# Patient Record
Sex: FEMALE | Race: BLACK OR AFRICAN AMERICAN | Employment: FULL TIME
[De-identification: names, ages, dates, MRNs, and addresses within clinical notes are randomized per-mention and may not be internally consistent; named-entity substitution may affect disease eponyms.]

---

## 2022-07-09 PROBLEM — M15.0 PRIMARY OSTEOARTHRITIS INVOLVING MULTIPLE JOINTS: Status: ACTIVE | Noted: 2022-07-09

## 2024-10-07 ENCOUNTER — HOSPITAL ENCOUNTER (OUTPATIENT)
Facility: HOSPITAL | Age: 59
Discharge: HOME | End: 2024-10-10
Attending: INTERNAL MEDICINE
Payer: COMMERCIAL

## 2024-10-07 DIAGNOSIS — Z12.31 ENCOUNTER FOR SCREENING MAMMOGRAM FOR MALIGNANT NEOPLASM OF BREAST: ICD-10-CM

## 2024-10-07 PROCEDURE — 77063 BREAST TOMOSYNTHESIS BI: CPT

## 2024-10-19 PROBLEM — Z13.5 SCREENING FOR GLAUCOMA: Status: RESOLVED | Noted: 2024-09-19 | Resolved: 2024-10-19

## 2024-11-04 RX ORDER — CETIRIZINE HYDROCHLORIDE 10 MG/1
TABLET ORAL
Qty: 90 TABLET | Refills: 1 | Status: SHIPPED | OUTPATIENT
Start: 2024-11-04

## 2024-11-04 RX ORDER — AMLODIPINE BESYLATE 5 MG/1
5 TABLET ORAL DAILY
Qty: 90 TABLET | Refills: 2 | Status: SHIPPED | OUTPATIENT
Start: 2024-11-04

## 2024-11-04 RX ORDER — LOSARTAN POTASSIUM AND HYDROCHLOROTHIAZIDE 12.5; 5 MG/1; MG/1
1 TABLET ORAL EVERY MORNING
Qty: 90 TABLET | Refills: 2 | Status: SHIPPED | OUTPATIENT
Start: 2024-11-04

## 2024-11-04 RX ORDER — AMLODIPINE BESYLATE 5 MG/1
5 TABLET ORAL DAILY
Qty: 90 TABLET | Refills: 2 | OUTPATIENT
Start: 2024-11-04

## 2024-11-04 RX ORDER — LOSARTAN POTASSIUM AND HYDROCHLOROTHIAZIDE 12.5; 5 MG/1; MG/1
1 TABLET ORAL DAILY
Qty: 90 TABLET | Refills: 2 | OUTPATIENT
Start: 2024-11-04

## 2024-11-17 RX ORDER — DULOXETIN HYDROCHLORIDE 30 MG/1
30 CAPSULE, DELAYED RELEASE ORAL EVERY MORNING
Qty: 90 CAPSULE | Refills: 1 | Status: SHIPPED | OUTPATIENT
Start: 2024-11-17

## 2024-12-11 RX ORDER — DICLOFENAC SODIUM 75 MG/1
TABLET, DELAYED RELEASE ORAL
Qty: 30 TABLET | Refills: 2 | Status: SHIPPED | OUTPATIENT
Start: 2024-12-11

## 2024-12-19 RX ORDER — DICLOFENAC SODIUM 75 MG/1
TABLET, DELAYED RELEASE ORAL
Qty: 30 TABLET | Refills: 0 | Status: SHIPPED | OUTPATIENT
Start: 2024-12-19

## 2024-12-20 ENCOUNTER — HOSPITAL ENCOUNTER (OUTPATIENT)
Facility: HOSPITAL | Age: 59
Discharge: HOME | End: 2024-12-23
Attending: OBSTETRICS & GYNECOLOGY
Payer: COMMERCIAL

## 2024-12-20 DIAGNOSIS — C78.6 SECONDARY MALIGNANT NEOPLASM OF RETROPERITONEUM AND PERITONEUM (HCC): ICD-10-CM

## 2024-12-20 DIAGNOSIS — Z51.11 ENCOUNTER FOR ANTINEOPLASTIC CHEMOTHERAPY: ICD-10-CM

## 2024-12-20 DIAGNOSIS — C54.1 ENDOMETRIAL SARCOMA (HCC): ICD-10-CM

## 2024-12-20 DIAGNOSIS — Z79.899 NEED FOR PROPHYLACTIC CHEMOTHERAPY: ICD-10-CM

## 2024-12-20 PROCEDURE — 6360000004 HC RX CONTRAST MEDICATION: Performed by: OBSTETRICS & GYNECOLOGY

## 2024-12-20 PROCEDURE — 74177 CT ABD & PELVIS W/CONTRAST: CPT

## 2024-12-20 RX ORDER — IOPAMIDOL 755 MG/ML
100 INJECTION, SOLUTION INTRAVASCULAR
Status: COMPLETED | OUTPATIENT
Start: 2024-12-20 | End: 2024-12-20

## 2024-12-20 RX ADMIN — IOPAMIDOL 100 ML: 755 INJECTION, SOLUTION INTRAVENOUS at 11:31

## 2025-02-02 ENCOUNTER — HOSPITAL ENCOUNTER (EMERGENCY)
Facility: HOSPITAL | Age: 60
Discharge: HOME | End: 2025-02-02
Attending: EMERGENCY MEDICINE
Payer: COMMERCIAL

## 2025-02-02 VITALS — TEMPERATURE: 97.7 F | HEART RATE: 91 BPM | SYSTOLIC BLOOD PRESSURE: 129 MMHG | DIASTOLIC BLOOD PRESSURE: 64 MMHG

## 2025-02-02 DIAGNOSIS — J02.9 VIRAL PHARYNGITIS: Primary | ICD-10-CM

## 2025-02-02 LAB
APPEARANCE UR: CLEAR
BACTERIA URNS QL MICRO: NEGATIVE /HPF
BILIRUB UR QL: NEGATIVE
COLOR UR: NORMAL
EPITH CASTS URNS QL MICRO: NORMAL /LPF
FLUAV RNA SPEC QL NAA+PROBE: NOT DETECTED
FLUBV RNA SPEC QL NAA+PROBE: NOT DETECTED
GLUCOSE UR STRIP.AUTO-MCNC: NEGATIVE MG/DL
HGB UR QL STRIP: NEGATIVE
KETONES UR QL STRIP.AUTO: NEGATIVE MG/DL
LEUKOCYTE ESTERASE UR QL STRIP.AUTO: NEGATIVE
NITRITE UR QL STRIP.AUTO: NEGATIVE
PH UR STRIP: 5.5 (ref 5–8)
PROT UR STRIP-MCNC: NEGATIVE MG/DL
RBC #/AREA URNS HPF: NORMAL /HPF
SARS-COV-2 RNA RESP QL NAA+PROBE: NOT DETECTED
SOURCE: NORMAL
SP GR UR REFRACTOMETRY: 1.02 (ref 1–1.03)
SPECIMEN HOLD: NORMAL
UROBILINOGEN UR QL STRIP.AUTO: 0.2 EU/DL (ref 0.2–1)
WBC URNS QL MICRO: NORMAL /HPF (ref 0–4)

## 2025-02-02 PROCEDURE — 87636 SARSCOV2 & INF A&B AMP PRB: CPT

## 2025-02-02 PROCEDURE — 6360000002 HC RX W HCPCS: Performed by: EMERGENCY MEDICINE

## 2025-02-02 PROCEDURE — 99283 EMERGENCY DEPT VISIT LOW MDM: CPT

## 2025-02-02 PROCEDURE — 81001 URINALYSIS AUTO W/SCOPE: CPT

## 2025-02-02 RX ORDER — DEXAMETHASONE SODIUM PHOSPHATE 10 MG/ML
10 INJECTION, SOLUTION INTRAMUSCULAR; INTRAVENOUS ONCE
Status: COMPLETED | OUTPATIENT
Start: 2025-02-02 | End: 2025-02-02

## 2025-02-02 RX ADMIN — DEXAMETHASONE SODIUM PHOSPHATE 10 MG: 10 INJECTION, SOLUTION INTRAMUSCULAR; INTRAVENOUS at 14:42

## 2025-02-02 ASSESSMENT — PAIN - FUNCTIONAL ASSESSMENT: PAIN_FUNCTIONAL_ASSESSMENT: NONE - DENIES PAIN

## 2025-02-02 NOTE — ED TRIAGE NOTES
Pt arrived to ED via wheelchair c/o sore throat. Pt denies N/V/D, fevers or headaches. Pt endorses rash to bilateral arms and bilateral thighs. Pt endorses dysuria.

## 2025-02-02 NOTE — ED PROVIDER NOTES
[facility] DE[facility]  EMERGENCY DEPARTMENT ENCOUNTER      Patient Name: Jackie Yuan  MRN: [id number]  Birthdate 1965  Date of Evaluation: 2025  Physician: Jake Lee MD    CHIEF COMPLAINT       Chief Complaint   Patient presents with    Pharyngitis       HISTORY OF PRESENT ILLNESS   (Location/Symptom, Timing/Onset, Context/Setting, Quality, Duration, Modifying Factors, Severity)   Jackie Yuan, 59 y.o., female     59-year-old female with a history of hypertension, hypertriglyceridemia, asthma, chronic pain presents with a chief complaint of sore throat and rash.  Patient has had a sore throat for the last 5 days.  She has noticed a rash on her upper extremities and her neck around her thighs.  She endorses new Dove soap over the last month.  She also complains of dysuria          Nursing Notes were reviewed.    REVIEW OF SYSTEMS    (Not required)   Review of Systems    Except as noted above the remainder of the review of systems was reviewed and negative.     PAST MEDICAL HISTORY     Past Medical History:   Diagnosis Date    Arthritis     Asthma     Chronic pain     High triglycerides 2022    Hypertension     Ulcer of right lower extremity, limited to breakdown of skin (HCC) 3/24/2022       SURGICAL HISTORY       Past Surgical History:   Procedure Laterality Date     SECTION       SECTION  1989    COLONOSCOPY      DILATION AND CURETTAGE OF UTERUS N/A 2023    DILATATION AND CURETTAGE HYSTEROSCOPY, ENDOMETRIAL EVALUATION performed by Anne Felix MD at [facility] MAIN OR       CURRENT MEDICATIONS       Previous Medications    ACETAMINOPHEN (TYLENOL) 650 MG EXTENDED RELEASE TABLET    Take 1 tablet by mouth 2 times daily as needed    ALBUTEROL SULFATE HFA (PROVENTIL;VENTOLIN;PROAIR) 108 (90 BASE) MCG/ACT INHALER    Inhale 2 puffs into the lungs every 6 hours as needed for Wheezing or Shortness of Breath    AMLODIPINE (NORVASC) 5 MG TABLET

## 2025-02-05 ENCOUNTER — OFFICE VISIT (OUTPATIENT)
Facility: CLINIC | Age: 60
End: 2025-02-05
Payer: COMMERCIAL

## 2025-02-05 ENCOUNTER — HOSPITAL ENCOUNTER (OUTPATIENT)
Facility: HOSPITAL | Age: 60
Discharge: HOME | End: 2025-02-05
Attending: SURGERY
Payer: COMMERCIAL

## 2025-02-05 VITALS
SYSTOLIC BLOOD PRESSURE: 133 MMHG | RESPIRATION RATE: 18 BRPM | HEART RATE: 104 BPM | DIASTOLIC BLOOD PRESSURE: 45 MMHG | TEMPERATURE: 97.2 F

## 2025-02-05 VITALS
DIASTOLIC BLOOD PRESSURE: 62 MMHG | HEART RATE: 63 BPM | HEIGHT: 64 IN | OXYGEN SATURATION: 98 % | RESPIRATION RATE: 16 BRPM | SYSTOLIC BLOOD PRESSURE: 112 MMHG | TEMPERATURE: 97.8 F | WEIGHT: 293 LBS | BODY MASS INDEX: 50.02 KG/M2

## 2025-02-05 DIAGNOSIS — S91.301A OPEN WOUND OF RIGHT FOOT, INITIAL ENCOUNTER: ICD-10-CM

## 2025-02-05 DIAGNOSIS — R73.03 PREDIABETES: Primary | ICD-10-CM

## 2025-02-05 DIAGNOSIS — S96.902A OPEN WOUND OF LEFT FOOT WITH TENDON INVOLVEMENT, INITIAL ENCOUNTER: ICD-10-CM

## 2025-02-05 DIAGNOSIS — Z51.81 MEDICATION MONITORING ENCOUNTER: ICD-10-CM

## 2025-02-05 DIAGNOSIS — R79.89 ELEVATED SERUM CREATININE: ICD-10-CM

## 2025-02-05 DIAGNOSIS — D64.9 ANEMIA, UNSPECIFIED TYPE: ICD-10-CM

## 2025-02-05 DIAGNOSIS — Z85.42 HISTORY OF UTERINE CANCER: ICD-10-CM

## 2025-02-05 DIAGNOSIS — S91.302A OPEN WOUND OF LEFT FOOT WITH TENDON INVOLVEMENT, INITIAL ENCOUNTER: ICD-10-CM

## 2025-02-05 DIAGNOSIS — R73.03 PREDIABETES: ICD-10-CM

## 2025-02-05 DIAGNOSIS — I10 ESSENTIAL HYPERTENSION: Primary | ICD-10-CM

## 2025-02-05 DIAGNOSIS — M47.27 OSTEOARTHRITIS OF SPINE WITH RADICULOPATHY, LUMBOSACRAL REGION: ICD-10-CM

## 2025-02-05 DIAGNOSIS — R21 RASH AND NONSPECIFIC SKIN ERUPTION: ICD-10-CM

## 2025-02-05 DIAGNOSIS — M15.0 PRIMARY OSTEOARTHRITIS INVOLVING MULTIPLE JOINTS: ICD-10-CM

## 2025-02-05 DIAGNOSIS — Z86.19 HISTORY OF CLOSTRIDIUM DIFFICILE INFECTION: ICD-10-CM

## 2025-02-05 DIAGNOSIS — J45.20 MILD INTERMITTENT ASTHMA WITHOUT COMPLICATION: ICD-10-CM

## 2025-02-05 DIAGNOSIS — I10 ESSENTIAL HYPERTENSION: ICD-10-CM

## 2025-02-05 DIAGNOSIS — Z79.899 LONG TERM CURRENT USE OF IMMUNOSUPPRESSIVE DRUG: ICD-10-CM

## 2025-02-05 PROCEDURE — 11046 DBRDMT MUSC&/FSCA EA ADDL: CPT

## 2025-02-05 PROCEDURE — 11043 DBRDMT MUSC&/FSCA 1ST 20/<: CPT

## 2025-02-05 PROCEDURE — 3078F DIAST BP <80 MM HG: CPT | Performed by: INTERNAL MEDICINE

## 2025-02-05 PROCEDURE — 99213 OFFICE O/P EST LOW 20 MIN: CPT

## 2025-02-05 PROCEDURE — 3074F SYST BP LT 130 MM HG: CPT | Performed by: INTERNAL MEDICINE

## 2025-02-05 PROCEDURE — 99214 OFFICE O/P EST MOD 30 MIN: CPT | Performed by: INTERNAL MEDICINE

## 2025-02-05 RX ORDER — TRIAMCINOLONE ACETONIDE 1 MG/G
CREAM TOPICAL 2 TIMES DAILY
COMMUNITY
Start: 2025-01-24

## 2025-02-05 RX ORDER — BETAMETHASONE DIPROPIONATE 0.5 MG/G
CREAM TOPICAL
Qty: 45 G | Refills: 1 | Status: SHIPPED | OUTPATIENT
Start: 2025-02-05

## 2025-02-05 RX ORDER — AZITHROMYCIN 250 MG/1
TABLET, FILM COATED ORAL
Qty: 6 TABLET | Refills: 0 | Status: SHIPPED | OUTPATIENT
Start: 2025-02-05 | End: 2025-02-15

## 2025-02-05 SDOH — ECONOMIC STABILITY: FOOD INSECURITY: WITHIN THE PAST 12 MONTHS, YOU WORRIED THAT YOUR FOOD WOULD RUN OUT BEFORE YOU GOT MONEY TO BUY MORE.: NEVER TRUE

## 2025-02-05 SDOH — ECONOMIC STABILITY: FOOD INSECURITY: WITHIN THE PAST 12 MONTHS, THE FOOD YOU BOUGHT JUST DIDN'T LAST AND YOU DIDN'T HAVE MONEY TO GET MORE.: NEVER TRUE

## 2025-02-05 ASSESSMENT — PATIENT HEALTH QUESTIONNAIRE - PHQ9
SUM OF ALL RESPONSES TO PHQ9 QUESTIONS 1 & 2: 0
SUM OF ALL RESPONSES TO PHQ QUESTIONS 1-9: 0
1. LITTLE INTEREST OR PLEASURE IN DOING THINGS: NOT AT ALL
SUM OF ALL RESPONSES TO PHQ QUESTIONS 1-9: 0
2. FEELING DOWN, DEPRESSED OR HOPELESS: NOT AT ALL
SUM OF ALL RESPONSES TO PHQ QUESTIONS 1-9: 0
SUM OF ALL RESPONSES TO PHQ QUESTIONS 1-9: 0

## 2025-02-05 ASSESSMENT — ENCOUNTER SYMPTOMS
ALLERGIC/IMMUNOLOGIC NEGATIVE: 1
GASTROINTESTINAL NEGATIVE: 1
RESPIRATORY NEGATIVE: 1

## 2025-02-05 ASSESSMENT — PAIN DESCRIPTION - LOCATION: LOCATION: LEG

## 2025-02-05 ASSESSMENT — PAIN SCALES - GENERAL: PAINLEVEL_OUTOF10: 7

## 2025-02-05 NOTE — PROGRESS NOTES
assessment and treatment was: [] 15-20 min  [] 21-30 min  [] 31-44 min  [x] 45 min or more  This included time retrieving and reviewing records with patient and education provided to patient regarding disease process(es), offloading or pressure relief, nutrition needed for wound healing, smoking cessation when applicable, and infection risk.    This time also included physician non-face-to-face service time visit on the date of service such as  Preparing to see the patient (eg, review of tests)  Obtaining and/or reviewing separately obtained history  Performing a medically necessary appropriate examination and/or evaluation  Counseling and educating the patient/family/caregiver  Ordering medications, tests, or procedures  Referring and communicating with other health care professionals as needed  Documenting clinical information in the electronic or other health record  Independently interpreting results (not reported separately) and communicating results to the patient/family/caregiver  Care coordination (not reported separately)    Wound 02/05/25 Ankle Right #1 (Active)   Wound Image   02/05/25 0826   Wound Etiology Arterial 02/05/25 0912   Dressing Status Intact;Old drainage noted 02/05/25 0826   Wound Cleansed Cleansed with saline 02/05/25 0826   Wound Length (cm) 13.5 cm 02/05/25 0826   Wound Width (cm) 5 cm 02/05/25 0826   Wound Depth (cm) 0.1 cm 02/05/25 0826   Wound Surface Area (cm^2) 67.5 cm^2 02/05/25 0826   Wound Volume (cm^3) 6.75 cm^3 02/05/25 0826   Post-Procedure Length (cm) 13.6 cm 02/05/25 0912   Post-Procedure Width (cm) 5.1 cm 02/05/25 0912   Post-Procedure Depth (cm) 0.2 cm 02/05/25 0912   Post-Procedure Surface Area (cm^2) 69.36 cm^2 02/05/25 0912   Post-Procedure Volume (cm^3) 13.872 cm^3 02/05/25 0912   Wound Assessment Exposed structure muscle 02/05/25 0912   Drainage Amount Large (50-75% saturated) 02/05/25 0826   Drainage Description Serosanguinous;Yellow 02/05/25 0826   Odor Mild 02/05/25

## 2025-02-05 NOTE — DISCHARGE INSTRUCTIONS
bed         [] sitting  [] Total non-weight bearing  [] Right Leg  [] Left Leg          [] Assistive Device     [] Walker        [] Cane           [] Wheelchair  [] Crutches              [] Surgical shoe    [] Wedge Shoe  [] Foam Boot(s)  [] Knee Scooter              [] Cast Boot   [] CROW Boot     [] Diabetic Shoes    [] Offloading heel boot  [x] Other: SHOES THAT DON'T PUT PRESSURE ON WOUNDS     Contact Cast:  Apply:  [] Total Contact Cast Applied in Clinic to      []RightLeg      []Left Leg              [] Do not get cast wet.  Contact wound center or go to emergency room if there is a foul odor or becomes uncomfortable due to feeling tight or swelling.  Do not use objects inside of cast to scratch.                  Dietary:  [x] Diet as tolerated:    [] Diabetic Diet:          [x] No Added Salt:  [x] Increase Protein:    [] Other:              Activity:  [x] Activity as tolerated  [] Patient has no activity restrictions      [] Strict Bedrest           [] Remain off Work:       [] May return to full duty work                                     [] Return to work with restrictions:         Physician:  [] Dr. Aileen Alvarez  [x] Dr. Christine Brown   [] Dr. Maribel Padilla  [] Jake Simmons PA-C  [] Dr. Juan Jacobo  [] Dr. Gen Muñoz  [] Dr. Shaila Blankenship     Nurse Case Manger:  SANDRA BAR RN        Wound Care Center Information: Should you experience any significant changes in your wound(s) or have questions about your wound care, please contact the Wound Center at [phone]. Our hours are Monday - Friday 8am - 4:30pm, closed all major holidays. If you need help with your wound outside these hours and cannot wait until we are again available, contact your PCP or go to the hospital emergency room.      PLEASE NOTE: IF YOU ARE UNABLE TO OBTAIN WOUND SUPPLIES, CONTINUE TO USE THE SUPPLIES YOU HAVE AVAILABLE UNTIL YOU ARE ABLE TO REACH US. IT IS MOST IMPORTANT TO KEEP THE WOUND COVERED AT ALL TIMES.

## 2025-02-05 NOTE — PROGRESS NOTES
Chief Complaint   Patient presents with    Follow-up Chronic Condition           /62 (Site: Right Upper Arm, Position: Sitting)   Pulse 63   Temp 97.8 °F (36.6 °C) (Oral)   Resp 16   Ht 1.626 m (5' 4\")   Wt (!) 151 kg (333 lb)   SpO2 98%   BMI 57.16 kg/m²           \"Have you been to the ER, urgent care clinic since your last visit?  Hospitalized since your last visit?\"    02/02/2025 pharyngitis    “Have you seen or consulted any other health care providers outside our system since your last visit?”    NO      “Have you had a diabetic eye exam?”    YES - Where: 01/2025 ([facility]) Nurse/CMA to request most recent records if not in the chart     No diabetic eye exam on file

## 2025-02-05 NOTE — WOUND CARE
Wound Care Supplies      Supply Company:     [employer] [address] [place] [address] f: [phone] f: [phone] p: [phone] [email or web address]      Ordering Center:     [facility]  [address] [place] [address], [address] [place] [address]    Phone: [phone]  Fax: [phone]    Patient Information:      Jackie Yuan  [facility] [address] [phone]   : 1965  AGE: 59 y.o.     GENDER: female   EPISODE DATE: 2025    Insurance:      PRIMARY INSURANCE:  Plan: [employer] [facility]  Coverage: [place] BCBS  Effective Date: 2024  Group Number: [unfilled]  Subscriber Number: [id number] - ([place] [employer])    Payer/Plan Subscr  Sex Relation Sub. Ins. ID Effective Group Num   1. [place] BCBS - ANT* BRENDON YUAN,* 1965 Female Self [id number]                                   [address], [facility] [id number]       Patient Wound Information:      Problem List Items Addressed This Visit          Other    BMI 60.0-69.9, adult    Prediabetes - Primary     Other Visit Diagnoses       Open wound of left foot with tendon involvement, initial encounter                WOUNDS REQUIRING DRESSING SUPPLIES:     Wound 25 Ankle Right #1 (Active)   Wound Image   25   Wound Etiology Venous 25   Dressing Status Intact;Old drainage noted 25   Wound Cleansed Cleansed with saline 25   Wound Length (cm) 13.5 cm 25 08   Wound Width (cm) 5 cm 25   Wound Depth (cm) 0.1 cm 25   Wound Surface Area (cm^2) 67.5 cm^2 25   Wound Volume (cm^3) 6.75 cm^3 25   Post-Procedure Length (cm) 13.6 cm 25 09   Post-Procedure Width (cm) 5.1 cm 25   Post-Procedure Depth (cm) 0.2 cm 25   Post-Procedure Surface Area (cm^2) 69.36 cm^2 2512   Post-Procedure Volume (cm^3) 13.872 cm^3 25   Wound 
Jake Simmons PA-C  [] Dr. Juan Jacobo  [] Dr. Gen Muñoz  [] Dr. Shaila Blankenship    Nurse Case Manger:  SANDRA BAR RN      Wound Care Center Information: Should you experience any significant changes in your wound(s) or have questions about your wound care, please contact the Wound Center at [phone]. Our hours are Monday - Friday 8am - 4:30pm, closed all major holidays. If you need help with your wound outside these hours and cannot wait until we are again available, contact your PCP or go to the hospital emergency room.     PLEASE NOTE: IF YOU ARE UNABLE TO OBTAIN WOUND SUPPLIES, CONTINUE TO USE THE SUPPLIES YOU HAVE AVAILABLE UNTIL YOU ARE ABLE TO REACH US. IT IS MOST IMPORTANT TO KEEP THE WOUND COVERED AT ALL TIMES.

## 2025-02-06 DIAGNOSIS — D64.9 ANEMIA, UNSPECIFIED TYPE: ICD-10-CM

## 2025-02-06 DIAGNOSIS — R79.89 ELEVATED SERUM CREATININE: ICD-10-CM

## 2025-02-06 LAB
ALBUMIN SERPL-MCNC: 4.3 G/DL (ref 3.9–4.9)
ALP SERPL-CCNC: 83 IU/L (ref 44–121)
ALT SERPL-CCNC: 11 IU/L (ref 0–32)
AST SERPL-CCNC: 15 IU/L (ref 0–40)
BASOPHILS # BLD AUTO: 0.1 X10E3/UL (ref 0–0.2)
BASOPHILS NFR BLD AUTO: 1 %
BILIRUB SERPL-MCNC: 0.3 MG/DL (ref 0–1.2)
BUN SERPL-MCNC: 30 MG/DL (ref 8–27)
BUN/CREAT SERPL: 21 (ref 12–28)
CALCIUM SERPL-MCNC: 9.1 MG/DL (ref 8.7–10.3)
CHLORIDE SERPL-SCNC: 100 MMOL/L (ref 96–106)
CO2 SERPL-SCNC: 22 MMOL/L (ref 20–29)
CREAT SERPL-MCNC: 1.42 MG/DL (ref 0.57–1)
EGFRCR SERPLBLD CKD-EPI 2021: 42 ML/MIN/1.73
EOSINOPHIL # BLD AUTO: 0.4 X10E3/UL (ref 0–0.4)
EOSINOPHIL NFR BLD AUTO: 5 %
ERYTHROCYTE [DISTWIDTH] IN BLOOD BY AUTOMATED COUNT: 13.9 % (ref 11.7–15.4)
GLOBULIN SER CALC-MCNC: 3.2 G/DL (ref 1.5–4.5)
GLUCOSE SERPL-MCNC: 93 MG/DL (ref 70–99)
HBA1C MFR BLD: 6.1 % (ref 4.8–5.6)
HCT VFR BLD AUTO: 31.5 % (ref 34–46.6)
HGB BLD-MCNC: 9.9 G/DL (ref 11.1–15.9)
IMM GRANULOCYTES # BLD AUTO: 0 X10E3/UL (ref 0–0.1)
IMM GRANULOCYTES NFR BLD AUTO: 0 %
LYMPHOCYTES # BLD AUTO: 1.3 X10E3/UL (ref 0.7–3.1)
LYMPHOCYTES NFR BLD AUTO: 17 %
MCH RBC QN AUTO: 26.2 PG (ref 26.6–33)
MCHC RBC AUTO-ENTMCNC: 31.4 G/DL (ref 31.5–35.7)
MCV RBC AUTO: 83 FL (ref 79–97)
MONOCYTES # BLD AUTO: 0.7 X10E3/UL (ref 0.1–0.9)
MONOCYTES NFR BLD AUTO: 9 %
NEUTROPHILS # BLD AUTO: 5.3 X10E3/UL (ref 1.4–7)
NEUTROPHILS NFR BLD AUTO: 68 %
PLATELET # BLD AUTO: 398 X10E3/UL (ref 150–450)
POTASSIUM SERPL-SCNC: 3.6 MMOL/L (ref 3.5–5.2)
PROT SERPL-MCNC: 7.5 G/DL (ref 6–8.5)
RBC # BLD AUTO: 3.78 X10E6/UL (ref 3.77–5.28)
SODIUM SERPL-SCNC: 141 MMOL/L (ref 134–144)
TSH SERPL DL<=0.005 MIU/L-ACNC: 2.07 UIU/ML (ref 0.45–4.5)
WBC # BLD AUTO: 7.8 X10E3/UL (ref 3.4–10.8)

## 2025-02-06 NOTE — PROGRESS NOTES
I called patient and explained the various differential diagnosis for elevated creatinine and low hemoglobin.  She has already stopped diclofenac since yesterday.  She should drink more water to prevent dehydration.  She should call her oncologist having surgery done for uterine cancer and treating her with Keytruda for anemia what she can take with her iron or B12 or folic acid or should give time because hemoglobin has dropped down.  And we will repeat CBC BMP in next 3 to 4 weeks.  She agreed.  She voiced understanding.  She says she will call her oncologist today night leave a message or she will call tomorrow

## 2025-02-13 ENCOUNTER — HOSPITAL ENCOUNTER (OUTPATIENT)
Facility: HOSPITAL | Age: 60
Discharge: HOME | End: 2025-02-13
Attending: SURGERY
Payer: COMMERCIAL

## 2025-02-13 VITALS
TEMPERATURE: 97.2 F | HEART RATE: 87 BPM | DIASTOLIC BLOOD PRESSURE: 53 MMHG | SYSTOLIC BLOOD PRESSURE: 118 MMHG | RESPIRATION RATE: 17 BRPM

## 2025-02-13 DIAGNOSIS — S91.301D OPEN WOUND OF RIGHT FOOT, SUBSEQUENT ENCOUNTER: ICD-10-CM

## 2025-02-13 DIAGNOSIS — S91.302D OPEN WOUND OF LEFT FOOT WITH TENDON INVOLVEMENT, SUBSEQUENT ENCOUNTER: ICD-10-CM

## 2025-02-13 DIAGNOSIS — S96.902D OPEN WOUND OF LEFT FOOT WITH TENDON INVOLVEMENT, SUBSEQUENT ENCOUNTER: ICD-10-CM

## 2025-02-13 DIAGNOSIS — R73.03 PREDIABETES: ICD-10-CM

## 2025-02-13 PROCEDURE — 99212 OFFICE O/P EST SF 10 MIN: CPT

## 2025-02-13 ASSESSMENT — PAIN SCALES - GENERAL: PAINLEVEL_OUTOF10: 5

## 2025-02-13 ASSESSMENT — PAIN DESCRIPTION - DESCRIPTORS: DESCRIPTORS: ACHING

## 2025-02-13 ASSESSMENT — PAIN DESCRIPTION - LOCATION: LOCATION: LEG

## 2025-02-13 ASSESSMENT — PAIN DESCRIPTION - ORIENTATION: ORIENTATION: RIGHT;LEFT

## 2025-02-13 NOTE — WOUND CARE
02/13/25 0846   Wound Cleansed Cleansed with saline 02/13/25 0846   Dressing/Treatment Collagen with Ag;Gauze dressing/dressing sponge;Dry dressing;Tubular bandage 02/13/25 0846   Wound Length (cm) 9.5 cm 02/13/25 0846   Wound Width (cm) 4.2 cm 02/13/25 0846   Wound Depth (cm) 0.1 cm 02/13/25 0846   Wound Surface Area (cm^2) 39.9 cm^2 02/13/25 0846   Change in Wound Size % (l*w) 0 02/13/25 0846   Wound Volume (cm^3) 3.99 cm^3 02/13/25 0846   Wound Healing % 0 02/13/25 0846   Post-Procedure Length (cm) 9.6 cm 02/05/25 0912   Post-Procedure Width (cm) 4.3 cm 02/05/25 0912   Post-Procedure Depth (cm) 0.2 cm 02/05/25 0912   Post-Procedure Surface Area (cm^2) 41.28 cm^2 02/05/25 0912   Post-Procedure Volume (cm^3) 8.256 cm^3 02/05/25 0912   Wound Assessment Pale granulation tissue;Slough;Exposed structure muscle 02/13/25 0846   Drainage Amount Large (50-75% saturated) 02/13/25 0846   Drainage Description Serosanguinous;Brown;Green 02/13/25 0846   Odor None 02/13/25 0846   Edwige-wound Assessment Maceration;Fragile 02/13/25 0846   Margins Attached edges 02/13/25 0846   Wound Thickness Description not for Pressure Injury Full thickness 02/13/25 0846   Number of days: 8        Wound was redressed per order and patient discharged after instructions given.      Megan Sadler RN

## 2025-02-26 ENCOUNTER — HOSPITAL ENCOUNTER (OUTPATIENT)
Facility: HOSPITAL | Age: 60
Discharge: HOME | End: 2025-02-26
Attending: SURGERY
Payer: COMMERCIAL

## 2025-02-26 VITALS
HEART RATE: 103 BPM | TEMPERATURE: 97.2 F | SYSTOLIC BLOOD PRESSURE: 102 MMHG | DIASTOLIC BLOOD PRESSURE: 56 MMHG | RESPIRATION RATE: 18 BRPM

## 2025-02-26 DIAGNOSIS — S91.302D OPEN WOUND OF LEFT FOOT WITH TENDON INVOLVEMENT, SUBSEQUENT ENCOUNTER: ICD-10-CM

## 2025-02-26 DIAGNOSIS — R73.03 PREDIABETES: ICD-10-CM

## 2025-02-26 DIAGNOSIS — S91.301D OPEN WOUND OF RIGHT FOOT, SUBSEQUENT ENCOUNTER: ICD-10-CM

## 2025-02-26 DIAGNOSIS — S91.001S OPEN WOUND OF RIGHT ANKLE, SEQUELA: ICD-10-CM

## 2025-02-26 DIAGNOSIS — S96.902D OPEN WOUND OF LEFT FOOT WITH TENDON INVOLVEMENT, SUBSEQUENT ENCOUNTER: ICD-10-CM

## 2025-02-26 DIAGNOSIS — S91.002S OPEN WOUND OF LEFT ANKLE, SEQUELA: ICD-10-CM

## 2025-02-26 PROBLEM — S91.001A OPEN WOUND OF RIGHT ANKLE: Status: ACTIVE | Noted: 2025-02-26

## 2025-02-26 PROBLEM — S91.002A OPEN WOUND OF LEFT ANKLE: Status: ACTIVE | Noted: 2025-02-26

## 2025-02-26 PROCEDURE — 11045 DBRDMT SUBQ TISS EACH ADDL: CPT

## 2025-02-26 PROCEDURE — 11042 DBRDMT SUBQ TIS 1ST 20SQCM/<: CPT

## 2025-02-26 PROCEDURE — 11046 DBRDMT MUSC&/FSCA EA ADDL: CPT

## 2025-02-26 PROCEDURE — 11043 DBRDMT MUSC&/FSCA 1ST 20/<: CPT

## 2025-02-26 NOTE — WOUND CARE
Wound Clinic Physician Orders and Discharge Instructions  [facility]  [address], [address]  [place] [address]  Telephone: [phone]     FAX [phone]    NAME:  Jackie Yuan  YOB: 1965  MEDICAL RECORD NUMBER:  [id number]  DATE:  2/26/2025      Return Appointment:  [x] Dressing Supply Provider: QUIN   [x] Home Healthcare: [facility]  [x] Return Appointment: 1 Week(s) THURSDAY WITH DR MELÉNDEZ  [] Nurse Visit:      [] Discharge from [facility]: [] Healed        [] Refer to Provider:         [] Consult    Follow-up Information:  [x] Ordered Tests:  PVR AND VENOUS REFLUX TO BE DONE AT [facility]- PLEASE CALL [phone]  -2/28/2025 10:00 AM  ARRIVE AT 9:30 AM.     [x] Referrals: DR GRAYSON- VASCULAR INTERVENTIONALIST- PATIENT TO SCHEDULE AND APPOINTMENT    [] Rx:   [] Would Culture:  [] Other:       Wound Cleansing:   Do not scrub or use excessive force.  Cleanse wound prior to applying a clean dressing with:  [x] Normal Saline OR    [x] Cleanse wound with Mild Soap & Water     [] Wound Cleanser   [] Keep Wound Dry in Shower  [] Wear a cast cover to shower  [] Other:       Topical Treatments:  Do not apply lotions, creams, or ointments to wound bed unless directed.   [] Apply moisturizing lotion to skin surrounding the wound prior to dressing change.  [] Apply antifungal ointment to skin surrounding the wound prior to dressing change.  [] Apply thin film of moisture barrier ointment to skin immediately around wound.  [] Apply Betadine to skin immediately around wound   [] Apply Skin Prep to skin immediately around wound  [] Other:     Dressings:           Wound Location RIGHT ANKLE  [x] Apply Primary Dressing:       [] MediHoney Gel [] MediHoney Alginate  [x] Calcium Alginate with Silver   [] Calcium Alginate without silver   [] Collagen with silver [] Collagen without Silver    [] Santyl with moist saline gauze     [] Hydrofera Blue (cut to size

## 2025-02-26 NOTE — PROGRESS NOTES
time needed to address patient's chief complaint today: [] Yes  [x] No  This time includes physician non-face-to-face service time visit on the date of service such as  Preparing to see the patient (eg, review of tests)  Obtaining and/or reviewing separately obtained history  Performing a medically necessary appropriate examination and/or evaluation  Counseling and educating the patient/family/caregiver  Ordering medications, tests, or procedures  Referring and communicating with other health care professionals as needed  Documenting clinical information in the electronic or other health record  Independently interpreting results (not reported separately) and communicating results to the patient/family/caregiver  Care coordination (not reported separately)    Objective:    BP (!) 102/56   Pulse (!) 103   Temp 97.2 °F (36.2 °C) (Temporal)   Resp 18   Wt Readings from Last 3 Encounters:   25 (!) 151 kg (333 lb)   24 (!) 151.5 kg (334 lb)   24 (!) 172.8 kg (381 lb)       PHYSICAL EXAM  General: Alert and in no acute distress. Normal appearing  Skin: Warm and dry, no rash  Head: Normocephalic and atraumatic  Eyes: Extraocular eye movements intact, conjunctivae normal, and sclera anicteric  ENT: Hearing grossly normal bilaterally. Normal appearance  Respiratory: no chest wall tenderness. no respiratory distress  GI: Abdomen non-tender and benign  Musculoskeletal: Baseline range of motion in joints. Nontender calves. No cyanosis. Edema 1+.  Neurologic: Speech normal. At baseline without new focal deficits. Mental status normal or at baseline    PAST MEDICAL HISTORY        Diagnosis Date    Arthritis     Asthma     Chronic pain     High triglycerides 2022    Hypertension     Ulcer of right lower extremity, limited to breakdown of skin ([facility]) 3/24/2022       PAST SURGICAL HISTORY    Past Surgical History:   Procedure Laterality Date     SECTION       SECTION  1989

## 2025-02-26 NOTE — DISCHARGE INSTRUCTIONS
Yani Shah RN  Registered Nurse     Wound Care      Signed     Date of Service: 2/26/2025  8:15 AM     Signed                          Wound Clinic Physician Orders and Discharge Instructions  [facility]  [facility] [address]  [place] [id number]  Telephone: [phone]     FAX [phone]     NAME:  Jackie Yuan  YOB: 1965  MEDICAL RECORD NUMBER:  [id number]  DATE:  2/26/2025        Return Appointment:  [x] Dressing Supply Provider: QUIN   [x] Home Healthcare: [facility]  [x] Return Appointment: 1 Week(s) THURSDAY WITH DR MELÉNDEZ  [] Nurse Visit:       [] Discharge from [facility]: [] Healed        [] Refer to Provider:         [] Consult     Follow-up Information:  [x] Ordered Tests:  PVR AND VENOUS REFLUX TO BE DONE AT [facility]- PLEASE CALL [phone]  -2/28/2025 10:00 AM  ARRIVE AT 9:30 AM.      [x] Referrals: DR GRAYSON- VASCULAR INTERVENTIONALIST- PATIENT TO SCHEDULE AND APPOINTMENT     [] Rx:   [] Would Culture:  [] Other:         Wound Cleansing:   Do not scrub or use excessive force.  Cleanse wound prior to applying a clean dressing with:  [x] Normal Saline OR    [x] Cleanse wound with Mild Soap & Water     [] Wound Cleanser   [] Keep Wound Dry in Shower  [] Wear a cast cover to shower  [] Other:        Topical Treatments:  Do not apply lotions, creams, or ointments to wound bed unless directed.   [] Apply moisturizing lotion to skin surrounding the wound prior to dressing change.  [] Apply antifungal ointment to skin surrounding the wound prior to dressing change.  [] Apply thin film of moisture barrier ointment to skin immediately around wound.  [] Apply Betadine to skin immediately around wound   [] Apply Skin Prep to skin immediately around wound  [] Other:      Dressings:                  Wound Location RIGHT ANKLE  [x] Apply Primary Dressing:                                          [] MediHoney Gel      [] MediHoney

## 2025-02-28 ENCOUNTER — HOSPITAL ENCOUNTER (OUTPATIENT)
Facility: HOSPITAL | Age: 60
End: 2025-02-28
Attending: SURGERY
Payer: COMMERCIAL

## 2025-02-28 DIAGNOSIS — R73.03 PREDIABETES: ICD-10-CM

## 2025-02-28 DIAGNOSIS — S91.301A OPEN WOUND OF RIGHT FOOT, INITIAL ENCOUNTER: ICD-10-CM

## 2025-02-28 DIAGNOSIS — S91.302A OPEN WOUND OF LEFT FOOT WITH TENDON INVOLVEMENT, INITIAL ENCOUNTER: ICD-10-CM

## 2025-02-28 DIAGNOSIS — S96.902A OPEN WOUND OF LEFT FOOT WITH TENDON INVOLVEMENT, INITIAL ENCOUNTER: ICD-10-CM

## 2025-02-28 PROCEDURE — 93970 EXTREMITY STUDY: CPT

## 2025-02-28 PROCEDURE — 93922 UPR/L XTREMITY ART 2 LEVELS: CPT

## 2025-02-28 PROCEDURE — 93922 UPR/L XTREMITY ART 2 LEVELS: CPT | Performed by: SURGERY

## 2025-03-01 LAB
VAS LEFT ABI: 1.35
VAS LEFT ARM BP: 112 MMHG
VAS LEFT PTA BP: 174 MMHG
VAS RIGHT ABI: 1.43
VAS RIGHT ARM BP: 129 MMHG
VAS RIGHT DORSALIS PEDIS BP: 185 MMHG
VAS RIGHT PTA BP: 134 MMHG

## 2025-03-01 PROCEDURE — 93970 EXTREMITY STUDY: CPT | Performed by: SURGERY

## 2025-03-01 PROCEDURE — 93922 UPR/L XTREMITY ART 2 LEVELS: CPT | Performed by: SURGERY

## 2025-03-06 ENCOUNTER — HOSPITAL ENCOUNTER (OUTPATIENT)
Facility: HOSPITAL | Age: 60
Discharge: HOME | End: 2025-03-06
Attending: SURGERY
Payer: COMMERCIAL

## 2025-03-06 VITALS
HEART RATE: 104 BPM | TEMPERATURE: 97.2 F | DIASTOLIC BLOOD PRESSURE: 49 MMHG | SYSTOLIC BLOOD PRESSURE: 120 MMHG | RESPIRATION RATE: 18 BRPM

## 2025-03-06 DIAGNOSIS — S91.302D OPEN WOUND OF LEFT FOOT WITH TENDON INVOLVEMENT, SUBSEQUENT ENCOUNTER: ICD-10-CM

## 2025-03-06 DIAGNOSIS — S91.301D OPEN WOUND OF RIGHT FOOT, SUBSEQUENT ENCOUNTER: ICD-10-CM

## 2025-03-06 DIAGNOSIS — S96.902D OPEN WOUND OF LEFT FOOT WITH TENDON INVOLVEMENT, SUBSEQUENT ENCOUNTER: ICD-10-CM

## 2025-03-06 DIAGNOSIS — R73.03 PREDIABETES: ICD-10-CM

## 2025-03-06 PROCEDURE — 11045 DBRDMT SUBQ TISS EACH ADDL: CPT

## 2025-03-06 PROCEDURE — 11721 DEBRIDE NAIL 6 OR MORE: CPT

## 2025-03-06 PROCEDURE — 11042 DBRDMT SUBQ TIS 1ST 20SQCM/<: CPT

## 2025-03-06 ASSESSMENT — PAIN DESCRIPTION - LOCATION: LOCATION: FOOT

## 2025-03-06 ASSESSMENT — PAIN DESCRIPTION - ORIENTATION: ORIENTATION: RIGHT

## 2025-03-06 ASSESSMENT — PAIN SCALES - GENERAL: PAINLEVEL_OUTOF10: 10

## 2025-03-06 ASSESSMENT — PAIN DESCRIPTION - DESCRIPTORS: DESCRIPTORS: ACHING

## 2025-03-06 NOTE — DISCHARGE INSTRUCTIONS
silver           [] Collagen without Silver    [] Santyl with moist saline gauze                [] Hydrofera Blue (cut to size and moistened with normal saline)   [] Hydrofera Blue Ready (cut to size)                      [] Normal Saline wet to dry   [] Betadine wet to dry                          [] Hydrogel                 [] Mepitel                      [] Bactroban/Mupirocin              [] Iodoform Packing Strip      [] Plain Packing Strip              [] Skin Sub:   [] Other:       [x] Cover and Secure with:                   [x] Gauze        [x] Heidy           [] Kerlix              [] Zetuvit Plus Silicone Border or Foam Border       [] Super Absorbant    [] ABD                              [] Ace Wrap   [] Other:               Limit contact of tape with skin.     [x] Change dressing:  [] Daily           [x] Every Other Day    [] Twice per week   [] Three times per week              [] Once a week          [] Do Not Change Dressing    [] Other:            Dressings:                  Wound Location LEFT DORSAL FOOT  [x] Apply Primary Dressing:                                          [] MediHoney Gel      [] MediHoney Alginate  [x] Calcium Alginate with Silver   [] Calcium Alginate without silver              [] Collagen with silver           [] Collagen without Silver    [] Santyl with moist saline gauze                [] Hydrofera Blue (cut to size and moistened with normal saline)   [] Hydrofera Blue Ready (cut to size)                      [] Normal Saline wet to dry   [] Betadine wet to dry                          [] Hydrogel                 [] Mepitel                      [] Bactroban/Mupirocin              [] Iodoform Packing Strip      [] Plain Packing Strip              [] Skin Sub:   [] Other:       [x] Cover and Secure with:                   [x] Gauze        [x] Heidy           [] Kerlix              [] Zetuvit Plus Silicone Border or Foam Border       [] Super Absorbant    [] ABD

## 2025-03-12 ENCOUNTER — HOSPITAL ENCOUNTER (OUTPATIENT)
Facility: HOSPITAL | Age: 60
Discharge: HOME | End: 2025-03-12
Attending: SURGERY
Payer: COMMERCIAL

## 2025-03-12 ENCOUNTER — TRANSCRIBE ORDERS (OUTPATIENT)
Facility: HOSPITAL | Age: 60
End: 2025-03-12

## 2025-03-12 VITALS
TEMPERATURE: 97.3 F | RESPIRATION RATE: 18 BRPM | HEART RATE: 96 BPM | DIASTOLIC BLOOD PRESSURE: 52 MMHG | SYSTOLIC BLOOD PRESSURE: 108 MMHG

## 2025-03-12 DIAGNOSIS — S91.001S OPEN WOUND OF RIGHT ANKLE, SEQUELA: ICD-10-CM

## 2025-03-12 DIAGNOSIS — L97.512 PLANTAR ULCER OF RIGHT FOOT WITH FAT LAYER EXPOSED (HCC): ICD-10-CM

## 2025-03-12 DIAGNOSIS — C54.9 SEROUS CARCINOMA OF BODY OF UTERUS (HCC): ICD-10-CM

## 2025-03-12 DIAGNOSIS — L97.523 CHRONIC FOOT ULCER, LEFT, WITH NECROSIS OF MUSCLE (HCC): ICD-10-CM

## 2025-03-12 DIAGNOSIS — Z51.11 ENCOUNTER FOR ANTINEOPLASTIC CHEMOTHERAPY: ICD-10-CM

## 2025-03-12 DIAGNOSIS — S91.301D OPEN WOUND OF RIGHT FOOT, SUBSEQUENT ENCOUNTER: ICD-10-CM

## 2025-03-12 DIAGNOSIS — L97.313: Primary | ICD-10-CM

## 2025-03-12 DIAGNOSIS — L97.912 CHRONIC ULCER OF RIGHT LOWER EXTREMITY WITH FAT LAYER EXPOSED (HCC): ICD-10-CM

## 2025-03-12 DIAGNOSIS — Z79.899 NEED FOR PROPHYLACTIC CHEMOTHERAPY: ICD-10-CM

## 2025-03-12 DIAGNOSIS — R73.03 PREDIABETES: ICD-10-CM

## 2025-03-12 DIAGNOSIS — S96.902D OPEN WOUND OF LEFT FOOT WITH TENDON INVOLVEMENT, SUBSEQUENT ENCOUNTER: ICD-10-CM

## 2025-03-12 DIAGNOSIS — S91.002S OPEN WOUND OF LEFT ANKLE, SEQUELA: ICD-10-CM

## 2025-03-12 DIAGNOSIS — L97.922 CHRONIC ULCER OF LEFT LOWER EXTREMITY WITH FAT LAYER EXPOSED (HCC): ICD-10-CM

## 2025-03-12 DIAGNOSIS — C54.1 ENDOMETRIAL SARCOMA (HCC): Primary | ICD-10-CM

## 2025-03-12 DIAGNOSIS — S91.302D OPEN WOUND OF LEFT FOOT WITH TENDON INVOLVEMENT, SUBSEQUENT ENCOUNTER: ICD-10-CM

## 2025-03-12 DIAGNOSIS — C78.6 SECONDARY MALIGNANT NEOPLASM OF RETROPERITONEUM AND PERITONEUM (HCC): ICD-10-CM

## 2025-03-12 PROCEDURE — 87186 SC STD MICRODIL/AGAR DIL: CPT

## 2025-03-12 PROCEDURE — 99215 OFFICE O/P EST HI 40 MIN: CPT

## 2025-03-12 PROCEDURE — 87205 SMEAR GRAM STAIN: CPT

## 2025-03-12 PROCEDURE — 87070 CULTURE OTHR SPECIMN AEROBIC: CPT

## 2025-03-12 RX ORDER — DOXYCYCLINE HYCLATE 100 MG
100 TABLET ORAL 2 TIMES DAILY
Qty: 14 TABLET | Refills: 0 | Status: SHIPPED | OUTPATIENT
Start: 2025-03-12 | End: 2025-03-19

## 2025-03-12 NOTE — WOUND CARE
Wound Clinic Physician Orders and Discharge Instructions  [facility]  [address], [address]  [place] [address]  Telephone: [phone]     FAX [phone]    NAME:  Jackie Yuan  YOB: 1965  MEDICAL RECORD NUMBER:  [id number]  DATE:  3/12/2025      Return Appointment:  [x] Dressing Supply Provider: QUIN   [] Home Healthcare:    [x] Return Appointment: 1 Week TUESDAY    [] Nurse Visit:      [] Discharge from [facility]: [] Healed        [] Refer to Provider:         [] Consult    Follow-up Information:  [] Ordered Tests:     [x] Referrals: DR GRAYSON- VASCULAR INTERVENTIONALIST- PATIENT TO SCHEDULE AND APPOINTMENT-03/18/2025-     DR PABLO- VASCULAR SURGEON- [facility]- PATIENT TO SCHEDULE AN APPOINTMENT    [x] Rx: DOXYCYCLINE SENT TO PHARMACY  [x] Would Culture: 3 CULTURES SENT OUT  [] Other:       Wound Cleansing:   Do not scrub or use excessive force.  Cleanse wound prior to applying a clean dressing with:  [x] Normal Saline OR    [x] Cleanse wound with Mild Soap & Water     [] Wound Cleanser   [] Keep Wound Dry in Shower  [] Wear a cast cover to shower  [] Other:       Topical Treatments:  Do not apply lotions, creams, or ointments to wound bed unless directed.   [] Apply moisturizing lotion to skin surrounding the wound prior to dressing change.  [] Apply antifungal ointment to skin surrounding the wound prior to dressing change.  [] Apply thin film of moisture barrier ointment to skin immediately around wound.  [] Apply Betadine to skin immediately around wound   [] Apply Skin Prep to skin immediately around wound  [] Other:     Dressings:           Wound Location RIGHT ANKLE  [x] Apply Primary Dressing:       [] MediHoney Gel [] MediHoney Alginate  [] Calcium Alginate with Silver   [x] Calcium Alginate without silver CLINIC   [] Collagen with silver [] Collagen without Silver    [] Santyl with moist saline gauze     [] Hydrofera

## 2025-03-12 NOTE — PROGRESS NOTES
[facility]   Wound Care and Hyperbaric Oxygen Therapy Center   Medical Staff Note     Jackie Yuan  MEDICAL RECORD NUMBER:  [id number]  AGE: 59 y.o.   GENDER: female  : 1965  EPISODE DATE:  3/12/2025    Chief complaint and reason for visit:     Chief Complaint   Patient presents with    Wound Check     Bilateral legs feet and thighs      Patient presenting for evaluation of wound(s) per chief complaint.      58 yo F with endometrial cancer and prediabetes presents with multiple lower extremity wounds. Patient is new to me, but not the wound care center. She had a reaction to chemotherapy medication Keytruda which resulted in the wounds on the upper thighs. She is unsure of how the lower extremity wounds started, but she and her  initially tried to take care of them on their own and they were getting worse. They started seeing the wound care center on 24. She reports wound odor and drainage. No wound erythema. No wound pain. She is still working and doing her own wound dressing changes.    She has an appointment with derm this week for wound biopsies. She is trying to see vascular. She has follow up with oncology at the end of the month.              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

## 2025-03-12 NOTE — DISCHARGE INSTRUCTIONS
Chimera, Yani, RN  Wound Clinic Physician Orders and Discharge Instructions  [facility]  [facility] [address], [address]  [place] [address]  Telephone: [phone]     FAX [phone]     NAME:  Jackie Yuan  YOB: 1965  MEDICAL RECORD NUMBER:  [id number]  DATE:  3/12/2025        Return Appointment:  [x] Dressing Supply Provider: QUIN   [] Home Healthcare:    [x] Return Appointment: 1 Week TUESDAY    [] Nurse Visit:       [] Discharge from [facility]: [] Healed        [] Refer to Provider:         [] Consult     Follow-up Information:  [] Ordered Tests:      [x] Referrals: DR GRAYSON- VASCULAR INTERVENTIONALIST- PATIENT TO SCHEDULE AND APPOINTMENT-03/18/2025-      DR PABLO- VASCULAR SURGEON- [facility]- PATIENT TO SCHEDULE AN APPOINTMENT     [x] Rx: DOXYCYCLINE SENT TO PHARMACY  [x] Would Culture: 3 CULTURES SENT OUT  [] Other:         Wound Cleansing:   Do not scrub or use excessive force.  Cleanse wound prior to applying a clean dressing with:  [x] Normal Saline OR    [x] Cleanse wound with Mild Soap & Water     [] Wound Cleanser   [] Keep Wound Dry in Shower  [] Wear a cast cover to shower  [] Other:        Topical Treatments:  Do not apply lotions, creams, or ointments to wound bed unless directed.   [] Apply moisturizing lotion to skin surrounding the wound prior to dressing change.  [] Apply antifungal ointment to skin surrounding the wound prior to dressing change.  [] Apply thin film of moisture barrier ointment to skin immediately around wound.  [] Apply Betadine to skin immediately around wound   [] Apply Skin Prep to skin immediately around wound  [] Other:      Dressings:                  Wound Location RIGHT ANKLE  [x] Apply Primary Dressing:                                          [] MediHoney Gel      [] MediHoney Alginate  [] Calcium Alginate with Silver   [x] Calcium Alginate without silver CLINIC              [] Collagen

## 2025-03-12 NOTE — WOUND CARE
Wound Care Supplies      Supply Company:     [employer] [address] [place] [address] f: [phone] f: [phone] p: [phone] [email or web address]      Ordering Center:     [facility]  [address] [place] [address], [address] [place] [address]    Phone: [phone]  Fax: [phone]    Patient Information:      Jackie Yuan  [address] [facility] [address] [phone]   : 1965  AGE: 59 y.o.     GENDER: female   EPISODE DATE: 3/12/2025    Insurance:      PRIMARY INSURANCE:  Plan: ANTH[employer]BS [facility]  Coverage: [place] BCBS  Effective Date: 2024  Group Number: [unfilled]  Subscriber Number: [id number] - ([place] [employer])    Payer/Plan Subscr  Sex Relation Sub. Ins. ID Effective Group Num   1. 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)    Relevant Orders    External Referral To Vascular Surgery       Other    BMI 60.0-69.9, adult    Prediabetes    Open wound of right foot    Relevant Orders    External Referral To Vascular Surgery    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 (HCC) - Primary    Chronic foot ulcer, left, with necrosis of muscle (HCC)       WOUNDS REQUIRING DRESSING SUPPLIES:     Wound 25 Ankle Right;Lateral #1 (Active)   Wound Image   25 1058   Wound Etiology Arterial 25 1058   Dressing Status Old drainage noted 25 1058   Wound Cleansed Cleansed with saline 25 1058   Dressing/Treatment Non adherent;Alginate with Ag;Dry dressing;Tubular bandage 25 1612   Wound Length (cm)

## 2025-03-14 LAB
BACTERIA SPEC CULT: NORMAL
GRAM STN SPEC: NORMAL
GRAM STN SPEC: NORMAL
Lab: NORMAL

## 2025-03-15 LAB
BACTERIA SPEC CULT: ABNORMAL
BACTERIA SPEC CULT: ABNORMAL
GRAM STN SPEC: ABNORMAL
Lab: ABNORMAL

## 2025-03-16 LAB
BACTERIA SPEC CULT: ABNORMAL
BACTERIA SPEC CULT: ABNORMAL
GRAM STN SPEC: ABNORMAL
GRAM STN SPEC: ABNORMAL
Lab: ABNORMAL

## 2025-03-18 ENCOUNTER — HOSPITAL ENCOUNTER (OUTPATIENT)
Facility: HOSPITAL | Age: 60
Discharge: HOME | End: 2025-03-18
Attending: SURGERY
Payer: COMMERCIAL

## 2025-03-18 VITALS
SYSTOLIC BLOOD PRESSURE: 111 MMHG | TEMPERATURE: 97.5 F | HEART RATE: 75 BPM | RESPIRATION RATE: 18 BRPM | DIASTOLIC BLOOD PRESSURE: 64 MMHG

## 2025-03-18 DIAGNOSIS — S96.902D OPEN WOUND OF LEFT FOOT WITH TENDON INVOLVEMENT, SUBSEQUENT ENCOUNTER: ICD-10-CM

## 2025-03-18 DIAGNOSIS — L97.313: ICD-10-CM

## 2025-03-18 DIAGNOSIS — L97.523 CHRONIC FOOT ULCER, LEFT, WITH NECROSIS OF MUSCLE (HCC): Primary | Chronic | ICD-10-CM

## 2025-03-18 DIAGNOSIS — L97.522 PLANTAR ULCER OF LEFT FOOT WITH FAT LAYER EXPOSED (HCC): ICD-10-CM

## 2025-03-18 DIAGNOSIS — T14.8XXA WOUND INFECTION: ICD-10-CM

## 2025-03-18 DIAGNOSIS — L97.922 CHRONIC ULCER OF LEFT LOWER EXTREMITY WITH FAT LAYER EXPOSED: ICD-10-CM

## 2025-03-18 DIAGNOSIS — L97.912 CHRONIC ULCER OF RIGHT LOWER EXTREMITY WITH FAT LAYER EXPOSED: ICD-10-CM

## 2025-03-18 DIAGNOSIS — L97.512 PLANTAR ULCER OF RIGHT FOOT WITH FAT LAYER EXPOSED (HCC): ICD-10-CM

## 2025-03-18 DIAGNOSIS — S91.302D OPEN WOUND OF LEFT FOOT WITH TENDON INVOLVEMENT, SUBSEQUENT ENCOUNTER: ICD-10-CM

## 2025-03-18 DIAGNOSIS — L97.322 CHRONIC ULCER OF LEFT ANKLE WITH FAT LAYER EXPOSED (HCC): ICD-10-CM

## 2025-03-18 DIAGNOSIS — L08.9 WOUND INFECTION: ICD-10-CM

## 2025-03-18 PROCEDURE — 99214 OFFICE O/P EST MOD 30 MIN: CPT

## 2025-03-18 PROCEDURE — 11042 DBRDMT SUBQ TIS 1ST 20SQCM/<: CPT

## 2025-03-18 PROCEDURE — 11043 DBRDMT MUSC&/FSCA 1ST 20/<: CPT

## 2025-03-18 PROCEDURE — 11046 DBRDMT MUSC&/FSCA EA ADDL: CPT

## 2025-03-18 PROCEDURE — 11045 DBRDMT SUBQ TISS EACH ADDL: CPT

## 2025-03-18 RX ORDER — PREDNISONE 10 MG/1
TABLET ORAL
COMMUNITY
Start: 2025-03-14

## 2025-03-18 RX ORDER — LEVOFLOXACIN 750 MG/1
750 TABLET, FILM COATED ORAL DAILY
Qty: 7 TABLET | Refills: 0 | Status: SHIPPED | OUTPATIENT
Start: 2025-03-18 | End: 2025-03-25

## 2025-03-18 NOTE — WOUND CARE
3/18/25 Wound cultures reviewed by Dr. Blankenship, Nicholasaqkemar ordered. Patient to finish doxycycline and start Levaquin 750 mg daily for 7 days. Patient informed of culture results and medication orders.   
Information: Should you experience any significant changes in your wound(s) or have questions about your wound care, please contact the Wound Center at [phone]. Our hours are Monday - Friday 8am - 4:30pm, closed all major holidays. If you need help with your wound outside these hours and cannot wait until we are again available, contact your PCP or go to the hospital emergency room.     PLEASE NOTE: IF YOU ARE UNABLE TO OBTAIN WOUND SUPPLIES, CONTINUE TO USE THE SUPPLIES YOU HAVE AVAILABLE UNTIL YOU ARE ABLE TO REACH US. IT IS MOST IMPORTANT TO KEEP THE WOUND COVERED AT ALL TIMES.

## 2025-03-24 ENCOUNTER — HOSPITAL ENCOUNTER (OUTPATIENT)
Facility: HOSPITAL | Age: 60
Discharge: HOME | End: 2025-03-27
Attending: OBSTETRICS & GYNECOLOGY
Payer: COMMERCIAL

## 2025-03-24 DIAGNOSIS — C54.1 ENDOMETRIAL SARCOMA (HCC): ICD-10-CM

## 2025-03-24 DIAGNOSIS — C78.6 SECONDARY MALIGNANT NEOPLASM OF RETROPERITONEUM AND PERITONEUM (HCC): ICD-10-CM

## 2025-03-24 DIAGNOSIS — Z51.11 ENCOUNTER FOR ANTINEOPLASTIC CHEMOTHERAPY: ICD-10-CM

## 2025-03-24 DIAGNOSIS — Z79.899 NEED FOR PROPHYLACTIC CHEMOTHERAPY: ICD-10-CM

## 2025-03-24 PROCEDURE — 71250 CT THORAX DX C-: CPT

## 2025-03-25 ENCOUNTER — HOSPITAL ENCOUNTER (OUTPATIENT)
Facility: HOSPITAL | Age: 60
Discharge: HOME | End: 2025-03-25
Attending: SURGERY
Payer: COMMERCIAL

## 2025-03-25 VITALS
HEART RATE: 76 BPM | RESPIRATION RATE: 18 BRPM | TEMPERATURE: 97.2 F | SYSTOLIC BLOOD PRESSURE: 72 MMHG | DIASTOLIC BLOOD PRESSURE: 46 MMHG

## 2025-03-25 DIAGNOSIS — S91.301A OPEN WOUND OF RIGHT FOOT, INITIAL ENCOUNTER: ICD-10-CM

## 2025-03-25 DIAGNOSIS — S91.301D OPEN WOUND OF RIGHT FOOT, SUBSEQUENT ENCOUNTER: ICD-10-CM

## 2025-03-25 DIAGNOSIS — S91.302D OPEN WOUND OF LEFT FOOT WITH TENDON INVOLVEMENT, SUBSEQUENT ENCOUNTER: ICD-10-CM

## 2025-03-25 DIAGNOSIS — R73.03 PREDIABETES: ICD-10-CM

## 2025-03-25 DIAGNOSIS — S91.302A OPEN WOUND OF LEFT FOOT WITH TENDON INVOLVEMENT, INITIAL ENCOUNTER: ICD-10-CM

## 2025-03-25 DIAGNOSIS — S96.902D OPEN WOUND OF LEFT FOOT WITH TENDON INVOLVEMENT, SUBSEQUENT ENCOUNTER: ICD-10-CM

## 2025-03-25 DIAGNOSIS — L97.512 PLANTAR ULCER OF RIGHT FOOT WITH FAT LAYER EXPOSED (HCC): ICD-10-CM

## 2025-03-25 DIAGNOSIS — L97.522 PLANTAR ULCER OF LEFT FOOT WITH FAT LAYER EXPOSED (HCC): ICD-10-CM

## 2025-03-25 DIAGNOSIS — S96.902A OPEN WOUND OF LEFT FOOT WITH TENDON INVOLVEMENT, INITIAL ENCOUNTER: ICD-10-CM

## 2025-03-25 DIAGNOSIS — L97.912 CHRONIC ULCER OF RIGHT LOWER EXTREMITY WITH FAT LAYER EXPOSED: ICD-10-CM

## 2025-03-25 DIAGNOSIS — L97.313: ICD-10-CM

## 2025-03-25 DIAGNOSIS — L97.322 CHRONIC ULCER OF LEFT ANKLE WITH FAT LAYER EXPOSED (HCC): ICD-10-CM

## 2025-03-25 DIAGNOSIS — L97.523 CHRONIC FOOT ULCER, LEFT, WITH NECROSIS OF MUSCLE (HCC): ICD-10-CM

## 2025-03-25 DIAGNOSIS — L97.922 CHRONIC ULCER OF LEFT LOWER EXTREMITY WITH FAT LAYER EXPOSED: ICD-10-CM

## 2025-03-25 PROCEDURE — 11046 DBRDMT MUSC&/FSCA EA ADDL: CPT

## 2025-03-25 PROCEDURE — 11042 DBRDMT SUBQ TIS 1ST 20SQCM/<: CPT

## 2025-03-25 PROCEDURE — 11043 DBRDMT MUSC&/FSCA 1ST 20/<: CPT

## 2025-03-25 PROCEDURE — 11045 DBRDMT SUBQ TISS EACH ADDL: CPT

## 2025-03-25 NOTE — WOUND CARE
Called [facility] to request biopsy results, MIGUEL.   
[] Offloading heel boot    [x] Other: SHOES THAT DON'T PUT PRESSURE ON WOUNDS    Contact Cast:  Apply: [] Total Contact Cast Applied in Clinic to []RightLeg []Left Leg   [] Do not get cast wet.  Contact wound center or go to emergency room if there is a foul odor or becomes uncomfortable due to feeling tight or swelling.  Do not use objects inside of cast to scratch.      Dietary:  [x] Diet as tolerated: [] Diabetic Diet: [x] No Added Salt:  [x] Increase Protein: [] Other:     Activity:  [x] Activity as tolerated  [] Patient has no activity restrictions      [] Strict Bedrest   [x]  Patient can work from home with restrictions until 4/4/25. Elevate legs frequently during work hours and after.    [] May return to full duty work                                    [] Return to work with restrictions:     Physician:  [] Dr. Aileen Alvarez  [] Dr. Christine Brown   [] Dr. Maribel Padilla  [] Jake Simmons PA-C  [] Dr. Juan Jacobo  [] Dr. Gen Muñoz  [x] Dr. Shaila Blankenship    Nurse Case Manger:  Megan \"T\", RN      Wound Care Center Information: Should you experience any significant changes in your wound(s) or have questions about your wound care, please contact the Wound Center at [phone]. Our hours are Monday - Friday 8am - 4:30pm, closed all major holidays. If you need help with your wound outside these hours and cannot wait until we are again available, contact your PCP or go to the hospital emergency room.     PLEASE NOTE: IF YOU ARE UNABLE TO OBTAIN WOUND SUPPLIES, CONTINUE TO USE THE SUPPLIES YOU HAVE AVAILABLE UNTIL YOU ARE ABLE TO REACH US. IT IS MOST IMPORTANT TO KEEP THE WOUND COVERED AT ALL TIMES.

## 2025-03-25 NOTE — PROGRESS NOTES
ulcer of right foot with fat layer exposed (HCC)    Relevant Medications    lidocaine 4 % jelly    Chronic ulcer of left lower extremity with fat layer exposed (HCC)    Relevant Medications    lidocaine 4 % jelly    Chronic ulcer of right lower extremity with fat layer exposed (HCC)    Relevant Medications    lidocaine 4 % jelly    Plantar ulcer of left foot with fat layer exposed (HCC)    Relevant Medications    lidocaine 4 % jelly    Chronic ulcer of left ankle with fat layer exposed (HCC)    Relevant Medications    lidocaine 4 % jelly         Wounds and Treatment Plan:  Right lateral ankle ulcer: Chronic. Presumed arterial. Measuring smaller    Left dorsal foot ulcer: : Chronic. Presumed arterial. Measuring smaller    Right plantar foot ulcer: Chronic. Arterial. Measuring smaller    Right medial ankle ulcer: : Chronic. Presumed arterial. Measuring smaller    Left lateral ankle ulcer: Chronic. Arterial. Measuring slightly larger.    Left upper leg ulcers: Medication side effect. Measuring larger    Right upper leg ulcer: Med side effect. Measuring smaller    Left medial ankle ulcer: Full thickness. Measuring smaller.    Left plantar foot ulcer: HEALED.     Debrided left dorsal foot, right plantar foot, right medial ankle, left lateral ankle, right thigh, left medial ankle wounds  Dressing: Mepilex transfer   Elevate legs  Increase protein in diet  Follow up with dermatology after biopsies done - scheduled for 3/28/25  Vascular planning arteriograms - scheduled for 3/31/25.   Oncology appt scheduled for first week of April   Follow up 1 week    Other associated diagnoses or problems addressed:  N/A    Pertinent imaging reviewed including independent interpretation include:  None    Pertinent labs reviewed.   Medical records and review of external note (s) from other providers done as well.    New lab or imaging orders placed:  N/A    Prescription drug management: N/A     Discussion of management or test 
Moderate: Comprehensive teaching/Verbalized Understanding

## 2025-03-25 NOTE — DISCHARGE INSTRUCTIONS
Megan Sadler, RN  Registered Nurse     Wound Care      Signed     Date of Service: 3/25/2025  1:15 PM     Signed                          Wound Clinic Physician Orders and Discharge Instructions  [facility]  [facility] [address], [address]  [place] [id number]  Telephone: [phone]     FAX [phone]     NAME:  Jackie Yuan  YOB: 1965  MEDICAL RECORD NUMBER:  [id number]  DATE:  3/25/2025        Return Appointment:  [] Dressing Supply Provider: QUIN   [] Home Healthcare:    [x] Return Appointment: 1 Week   [] Nurse Visit:       [] Discharge from [facility]: [] Healed        [] Refer to Provider:         [] Consult     Follow-up Information:  [] Ordered Tests:      [] Referrals: DR GRAYSON- VASCULAR INTERVENTIONALIST- PATIENT TO SCHEDULE AND APPOINTMENT-03/18/2025-      DR PABLO- Consult completed, angiogram planned for right and left leg, next appointment 3/31/25.     [] Rx: 3/12/25 DOXYCYCLINE SENT TO PHARMACY 3/18/25 Cultures reviewed by Dr. Blankenship, Levaquin ordered, finish doxycycline and start Levaquin 750 mg daily for 7 days.   [] Would Culture:   [x] Other: 3/14/25 Dermatology completed left thigh biopsy and prescribed prednisone 10 mg (4 tablets once a day for 5 days). Next appointment 3/28/25.         Wound Cleansing:   Do not scrub or use excessive force.  Cleanse wound prior to applying a clean dressing with:  [x] Normal Saline OR    [x] Cleanse wound with Mild Soap & Water     [] Wound Cleanser   [] Keep Wound Dry in Shower  [] Wear a cast cover to shower  [] Other:        Topical Treatments:  Do not apply lotions, creams, or ointments to wound bed unless directed.   [] Apply moisturizing lotion to skin surrounding the wound prior to dressing change.  [] Apply antifungal ointment to skin surrounding the wound prior to dressing change.  [] Apply thin film of moisture barrier ointment to skin immediately around wound.  [] Apply Betadine to skin

## 2025-04-01 ENCOUNTER — HOSPITAL ENCOUNTER (OUTPATIENT)
Facility: HOSPITAL | Age: 60
Discharge: HOME | End: 2025-04-01
Attending: SURGERY
Payer: COMMERCIAL

## 2025-04-01 VITALS
HEART RATE: 90 BPM | RESPIRATION RATE: 18 BRPM | SYSTOLIC BLOOD PRESSURE: 118 MMHG | TEMPERATURE: 97.2 F | DIASTOLIC BLOOD PRESSURE: 56 MMHG

## 2025-04-01 DIAGNOSIS — L97.912 CHRONIC ULCER OF RIGHT LOWER EXTREMITY WITH FAT LAYER EXPOSED: ICD-10-CM

## 2025-04-01 DIAGNOSIS — S96.902A OPEN WOUND OF LEFT FOOT WITH TENDON INVOLVEMENT, INITIAL ENCOUNTER: ICD-10-CM

## 2025-04-01 DIAGNOSIS — L97.523 CHRONIC FOOT ULCER, LEFT, WITH NECROSIS OF MUSCLE (HCC): ICD-10-CM

## 2025-04-01 DIAGNOSIS — S91.302D OPEN WOUND OF LEFT FOOT WITH TENDON INVOLVEMENT, SUBSEQUENT ENCOUNTER: ICD-10-CM

## 2025-04-01 DIAGNOSIS — S96.902D OPEN WOUND OF LEFT FOOT WITH TENDON INVOLVEMENT, SUBSEQUENT ENCOUNTER: ICD-10-CM

## 2025-04-01 DIAGNOSIS — S91.301A OPEN WOUND OF RIGHT FOOT, INITIAL ENCOUNTER: ICD-10-CM

## 2025-04-01 DIAGNOSIS — L97.922 CHRONIC ULCER OF LEFT LOWER EXTREMITY WITH FAT LAYER EXPOSED: ICD-10-CM

## 2025-04-01 DIAGNOSIS — S91.302A OPEN WOUND OF LEFT FOOT WITH TENDON INVOLVEMENT, INITIAL ENCOUNTER: ICD-10-CM

## 2025-04-01 DIAGNOSIS — S91.301D OPEN WOUND OF RIGHT FOOT, SUBSEQUENT ENCOUNTER: ICD-10-CM

## 2025-04-01 DIAGNOSIS — L97.522 PLANTAR ULCER OF LEFT FOOT WITH FAT LAYER EXPOSED (HCC): ICD-10-CM

## 2025-04-01 DIAGNOSIS — L97.322 CHRONIC ULCER OF LEFT ANKLE WITH FAT LAYER EXPOSED (HCC): ICD-10-CM

## 2025-04-01 DIAGNOSIS — L97.512 PLANTAR ULCER OF RIGHT FOOT WITH FAT LAYER EXPOSED (HCC): ICD-10-CM

## 2025-04-01 DIAGNOSIS — L97.313: ICD-10-CM

## 2025-04-01 DIAGNOSIS — R73.03 PREDIABETES: ICD-10-CM

## 2025-04-01 PROCEDURE — 11042 DBRDMT SUBQ TIS 1ST 20SQCM/<: CPT

## 2025-04-01 PROCEDURE — 11046 DBRDMT MUSC&/FSCA EA ADDL: CPT

## 2025-04-01 PROCEDURE — 11043 DBRDMT MUSC&/FSCA 1ST 20/<: CPT

## 2025-04-01 PROCEDURE — 11045 DBRDMT SUBQ TISS EACH ADDL: CPT

## 2025-04-01 RX ORDER — METRONIDAZOLE 500 MG/1
500 TABLET ORAL 2 TIMES DAILY
COMMUNITY

## 2025-04-01 NOTE — WOUND CARE
Wound Clinic Physician Orders and Discharge Instructions  [facility]  [address], [address] [place] [address]  Telephone: [phone]     FAX [phone]    NAME:  Jackie Yuan  YOB: 1965  MEDICAL RECORD NUMBER:  [id number]  DATE:  4/1/2025      To Whom It May Concern,      Due to medical reasons, Jackie Yuan may continue to work but should continue to work from home until 04/25/25. She needs to elevate her legs as much as possible. We will continue assess her wound needs at each appointment. Please contact our office with any concerns/questions.        Sincerely,      Dr. Linton Varys   04/01/25

## 2025-04-01 NOTE — PROGRESS NOTES
[facility]   Wound Care and Hyperbaric Oxygen Therapy Center   Medical Staff Note     Jackie Yuan  MEDICAL RECORD NUMBER:  [id number]  AGE: 60 y.o.   GENDER: female  : 1965  EPISODE DATE:  2025    Chief complaint and reason for visit:     Chief Complaint   Patient presents with    Wound Check     Right ankle, left ankle, left foot, right foot, left upper leg and right upper leg wounds.       Patient presenting for evaluation of wound(s) per chief complaint.      60 yo F with endometrial cancer and prediabetes presents with multiple lower extremity wounds. She had a reaction to chemotherapy medication Keytruda which resulted in the wounds on the upper thighs.        Continued intermittent wound pain.  No wound erythema.  Minimal wound drainage.  Compliant with dressing changes.  She has been working from home and able to keep her legs elevated.  Keeping legs elevated has decreased leg swelling and wound discharge.  Vascular arteriogram needs to be rescheduled because insurance authorization did not come through.  She is waiting to hear from vascular about rescheduling.  Patient completed antibiotics.  She continues on steroids from Derm with follow-up at the end of the month.  She has follow-up with oncology tomorrow.    History of Wound Context: Per original history and physical on this patient. Changes in history since last evaluation: none      Medical Decision Making:     Problem List Items Addressed This Visit          Musculoskeletal and Integument    Open wound of left foot with tendon involvement    Relevant Medications    lidocaine 4 % jelly    Other Relevant Orders    Initiate Outpatient Wound Care Protocol       Other    BMI 60.0-69.9, adult - Primary    Relevant Medications    lidocaine 4 % jelly    Other Relevant Orders    Initiate Outpatient Wound Care Protocol    Prediabetes    Relevant Medications    lidocaine 4 % jelly    Other Relevant Orders    Initiate

## 2025-04-01 NOTE — WOUND CARE
Wound Clinic Physician Orders and Discharge Instructions  [facility]  [address], [address]  [place] [address]  Telephone: [phone]     FAX [phone]    NAME:  Jackie Yuan  YOB: 1965  MEDICAL RECORD NUMBER:  [id number]  DATE:  4/1/2025      Return Appointment:  [] Dressing Supply Provider: Shane  [] Home Healthcare:  [] Facility:   [x] Return Appointment: 1 Week(s)  [] Nurse Visit:  Week(s)    [] Discharge from [facility]:   [] Healed          [] Refer to Provider:           [] Consult    Follow-up Information:  [] Ordered Tests:   [x] Referrals: [facility] (Dr. Blankenship reviewed biopsy results) 04/25/25 & Dr. Garcia (vascular) - Patient to make and keep follow-ups - Angio planned but waiting on insurance approval   [] Rx:   [] Culture:  [] Wound Vac:  [] Skin Sub:   [] OR:  [] Lymphedema Pumps:  [] Other:     Wound Cleansing:   Do not scrub or use excessive force.  Cleanse wound prior to applying a clean dressing with:  [x] Normal Saline   [] Keep Wound Dry in Shower     [] Wound Cleanser   [x] Cleanse wound with Mild Soap & Water    [] Other:       Topical Treatments:  Do not apply lotions, creams, or ointments to wound bed unless directed.   [] Apply moisturizing lotion to skin surrounding the wound prior to dressing change.  [] Apply antifungal ointment to skin surrounding the wound prior to dressing change.  [] Apply thin film of moisture barrier ointment to skin immediately around wound.  [] Apply Betadine to skin immediately around wound   [] Other:      Dressings:           Wound Location: Right ankle, Left foot, Left Ankle, Left leg, and Right leg    [x] Apply Primary Dressing:       [] MediHoney Gel [] MediHoney Alginate  [] Calcium Alginate with Silver   [] Calcium Alginate without silver   [] Collagen with silver [] Collagen without Silver    [] Santyl with moist saline gauze     [] Hydrofera Blue (cut to size and

## 2025-04-01 NOTE — DISCHARGE INSTRUCTIONS
Wound Clinic Physician Orders and Discharge Instructions  [facility]  [address], [address]  [place] [address]  Telephone: [phone]     FAX [phone]    NAME:  Jackie Yuan  YOB: 1965  MEDICAL RECORD NUMBER:  [id number]  DATE:  4/1/2025      Return Appointment:  [] Dressing Supply Provider: Shane  [] Home Healthcare:  [] Facility:   [x] Return Appointment: 1 Week(s)  [] Nurse Visit:  Week(s)    [] Discharge from [facility]:   [] Healed          [] Refer to Provider:           [] Consult    Follow-up Information:  [] Ordered Tests:   [x] Referrals: [facility] (Dr. Blankenship reviewed biopsy results) 04/25/25 & Dr. Garcia (vascular) - Patient to make and keep follow-ups - Angio planned but waiting on insurance approval   [] Rx:   [] Culture:  [] Wound Vac:  [] Skin Sub:   [] OR:  [] Lymphedema Pumps:  [] Other:     Wound Cleansing:   Do not scrub or use excessive force.  Cleanse wound prior to applying a clean dressing with:  [x] Normal Saline   [] Keep Wound Dry in Shower     [] Wound Cleanser   [x] Cleanse wound with Mild Soap & Water    [] Other:       Topical Treatments:  Do not apply lotions, creams, or ointments to wound bed unless directed.   [] Apply moisturizing lotion to skin surrounding the wound prior to dressing change.  [] Apply antifungal ointment to skin surrounding the wound prior to dressing change.  [] Apply thin film of moisture barrier ointment to skin immediately around wound.  [] Apply Betadine to skin immediately around wound   [] Other:      Dressings:           Wound Location: Right ankle, Left foot, Left Ankle, Left leg, and Right leg    [x] Apply Primary Dressing:       [] MediHoney Gel [] MediHoney Alginate  [] Calcium Alginate with Silver   [] Calcium Alginate without silver   [] Collagen with silver [] Collagen without Silver    [] Santyl with moist saline gauze     [] Hydrofera Blue (cut to size and

## 2025-04-08 ENCOUNTER — HOSPITAL ENCOUNTER (OUTPATIENT)
Facility: HOSPITAL | Age: 60
Discharge: HOME | End: 2025-04-08
Attending: SURGERY
Payer: COMMERCIAL

## 2025-04-08 VITALS — SYSTOLIC BLOOD PRESSURE: 123 MMHG | DIASTOLIC BLOOD PRESSURE: 56 MMHG | RESPIRATION RATE: 18 BRPM | TEMPERATURE: 97.2 F

## 2025-04-08 DIAGNOSIS — L97.522 PLANTAR ULCER OF LEFT FOOT WITH FAT LAYER EXPOSED (HCC): ICD-10-CM

## 2025-04-08 DIAGNOSIS — L97.512 PLANTAR ULCER OF RIGHT FOOT WITH FAT LAYER EXPOSED (HCC): ICD-10-CM

## 2025-04-08 DIAGNOSIS — S91.301A OPEN WOUND OF RIGHT FOOT, INITIAL ENCOUNTER: ICD-10-CM

## 2025-04-08 DIAGNOSIS — S96.902A OPEN WOUND OF LEFT FOOT WITH TENDON INVOLVEMENT, INITIAL ENCOUNTER: ICD-10-CM

## 2025-04-08 DIAGNOSIS — L97.322 CHRONIC ULCER OF LEFT ANKLE WITH FAT LAYER EXPOSED (HCC): ICD-10-CM

## 2025-04-08 DIAGNOSIS — L97.313: ICD-10-CM

## 2025-04-08 DIAGNOSIS — S91.302A OPEN WOUND OF LEFT FOOT WITH TENDON INVOLVEMENT, INITIAL ENCOUNTER: ICD-10-CM

## 2025-04-08 DIAGNOSIS — L97.912 CHRONIC ULCER OF RIGHT LOWER EXTREMITY WITH FAT LAYER EXPOSED: ICD-10-CM

## 2025-04-08 DIAGNOSIS — L97.922 CHRONIC ULCER OF LEFT LOWER EXTREMITY WITH FAT LAYER EXPOSED: ICD-10-CM

## 2025-04-08 DIAGNOSIS — L97.523 CHRONIC FOOT ULCER, LEFT, WITH NECROSIS OF MUSCLE (HCC): ICD-10-CM

## 2025-04-08 DIAGNOSIS — R73.03 PREDIABETES: ICD-10-CM

## 2025-04-08 PROCEDURE — 11046 DBRDMT MUSC&/FSCA EA ADDL: CPT

## 2025-04-08 PROCEDURE — 11043 DBRDMT MUSC&/FSCA 1ST 20/<: CPT

## 2025-04-08 PROCEDURE — 11042 DBRDMT SUBQ TIS 1ST 20SQCM/<: CPT

## 2025-04-08 PROCEDURE — 11045 DBRDMT SUBQ TISS EACH ADDL: CPT

## 2025-04-08 NOTE — PROGRESS NOTES
tablet 1    magnesium oxide (MAG-OX) 400 (240 Mg) MG tablet Take 2 tablets by mouth daily      gabapentin (NEURONTIN) 300 MG capsule Take 1 capsule by mouth 2 times daily for 180 days. Intended supply: 90 days Max Daily Amount: 600 mg 180 capsule 1    albuterol sulfate HFA (PROVENTIL;VENTOLIN;PROAIR) 108 (90 Base) MCG/ACT inhaler Inhale 2 puffs into the lungs every 6 hours as needed for Wheezing or Shortness of Breath 18 g 5    Apple Juan Vn-Grn Tea-Bit Or-Cr (APPLE CIDER VINEGAR PLUS PO) Take 1 tablet by mouth daily      Cholecalciferol (VITAMIN D3) 125 MCG (5000 UT) TABS Take 1 tablet by mouth daily      latanoprost (XALATAN) 0.005 % ophthalmic solution       acetaminophen (TYLENOL) 650 MG extended release tablet Take 1 tablet by mouth 2 times daily as needed      azelastine (ASTELIN) 0.1 % nasal spray 2 sprays by Nasal route daily as needed       No current facility-administered medications on file prior to encounter.         Written patient dismissal instructions given to patient and signed by patient or POA.         Electronically signed by Shaila Blankenship MD on 4/8/2025 at 2:23 PM

## 2025-04-08 NOTE — WOUND CARE
Wound Care Supplies      Supply Company:     [employer] [address] [place] [address] f: [phone] f: [phone] p: [phone] [email or web address]      Ordering Center:     [facility]  [address] [place] [address], [address] [place], [place] [address]    Phone: [phone]  Fax: [phone]    Patient Information:      Jackie Yuan  [address] [facility] [id number] [phone]   : 1965  AGE: 60 y.o.     GENDER: female   EPISODE DATE: 2025    Insurance:      PRIMARY INSURANCE:  Plan: [facility]  Coverage: [place]  Effective Date: 2025  Group Number: [unfilled]  Subscriber Number: [id number] - ([facility])    Payer/Plan Subscr  Sex Relation Sub. Ins. ID Effective Group Num   1. [place] - [place]T* CHANTELLE YUAN 1955 Male Spouse [id number]                                   [address] [address]       Patient Wound Information:      Problem List Items Addressed This Visit          Musculoskeletal and Integument    Open wound of left foot with tendon involvement    Relevant Medications    lidocaine 4 % jelly    Other Relevant Orders    Initiate Outpatient Wound Care Protocol       Other    BMI 60.0-69.9, adult - Primary    Relevant Medications    lidocaine 4 % jelly    Other Relevant Orders    Initiate Outpatient Wound Care Protocol    Prediabetes    Relevant Medications    lidocaine 4 % jelly    Other Relevant Orders    Initiate Outpatient Wound Care Protocol    Open wound of right foot    Relevant Medications    lidocaine 4 % jelly    Other Relevant Orders    Initiate Outpatient Wound Care Protocol    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

## 2025-04-08 NOTE — WOUND CARE
Wound Clinic Physician Orders and Discharge Instructions  [facility]  [address], [address]  [place] [address]  Telephone: [phone]     FAX [phone]    NAME:  Jackie Yuan  YOB: 1965  MEDICAL RECORD NUMBER:  [id number]  DATE:  4/8/2025      Return Appointment:  [x] Dressing Supply Provider: Shane  [] Home Healthcare:  [] Facility:   [x] Return Appointment: 1 Week(s)  [] Nurse Visit:  Week(s)    [] Discharge from [facility]:   [] Healed          [] Refer to Provider:           [] Consult    Follow-up Information:  [] Ordered Tests:   [x] Referrals: [facility] (Dr. Blankenship reviewed biopsy results) 04/25/25 & Dr. Garcia (vascular) - Patient to make and keep follow-ups - Angio planned but waiting on insurance approval   [] Rx:   [] Culture:  [] Wound Vac:  [] Skin Sub:   [] OR:  [] Lymphedema Pumps:  [] Other:     Wound Cleansing:   Do not scrub or use excessive force.  Cleanse wound prior to applying a clean dressing with:  [x] Normal Saline   [] Keep Wound Dry in Shower     [] Wound Cleanser   [x] Cleanse wound with Mild Soap & Water    [] Other:       Topical Treatments:  Do not apply lotions, creams, or ointments to wound bed unless directed.   [] Apply moisturizing lotion to skin surrounding the wound prior to dressing change.  [] Apply antifungal ointment to skin surrounding the wound prior to dressing change.  [] Apply thin film of moisture barrier ointment to skin immediately around wound.  [] Apply Betadine to skin immediately around wound   [] Other:      Dressings:           Wound Location: Right ankle, Left foot, Left Ankle, Left leg, and Right leg    [x] Apply Primary Dressing:       [] MediHoney Gel [] MediHoney Alginate  [] Calcium Alginate with Silver   [] Calcium Alginate without silver   [] Collagen with silver [] Collagen without Silver    [] Santyl with moist saline gauze     [] Hydrofera Blue (cut to size and

## 2025-04-22 ENCOUNTER — HOSPITAL ENCOUNTER (OUTPATIENT)
Facility: HOSPITAL | Age: 60
Discharge: HOME | End: 2025-04-22
Attending: SURGERY
Payer: COMMERCIAL

## 2025-04-22 VITALS
TEMPERATURE: 97.2 F | SYSTOLIC BLOOD PRESSURE: 148 MMHG | DIASTOLIC BLOOD PRESSURE: 54 MMHG | HEART RATE: 102 BPM | RESPIRATION RATE: 18 BRPM

## 2025-04-22 DIAGNOSIS — L97.313: ICD-10-CM

## 2025-04-22 DIAGNOSIS — S91.302A OPEN WOUND OF LEFT FOOT WITH TENDON INVOLVEMENT, INITIAL ENCOUNTER: ICD-10-CM

## 2025-04-22 DIAGNOSIS — S91.301A OPEN WOUND OF RIGHT FOOT, INITIAL ENCOUNTER: ICD-10-CM

## 2025-04-22 DIAGNOSIS — L97.522 PLANTAR ULCER OF LEFT FOOT WITH FAT LAYER EXPOSED (HCC): ICD-10-CM

## 2025-04-22 DIAGNOSIS — L97.922 CHRONIC ULCER OF LEFT LOWER EXTREMITY WITH FAT LAYER EXPOSED (HCC): ICD-10-CM

## 2025-04-22 DIAGNOSIS — L97.523 CHRONIC FOOT ULCER, LEFT, WITH NECROSIS OF MUSCLE (HCC): Primary | ICD-10-CM

## 2025-04-22 DIAGNOSIS — L97.322 CHRONIC ULCER OF LEFT ANKLE WITH FAT LAYER EXPOSED (HCC): ICD-10-CM

## 2025-04-22 DIAGNOSIS — T14.8XXA WOUND INFECTION: ICD-10-CM

## 2025-04-22 DIAGNOSIS — R73.03 PREDIABETES: ICD-10-CM

## 2025-04-22 DIAGNOSIS — S96.902A OPEN WOUND OF LEFT FOOT WITH TENDON INVOLVEMENT, INITIAL ENCOUNTER: ICD-10-CM

## 2025-04-22 DIAGNOSIS — L97.912 CHRONIC ULCER OF RIGHT LOWER EXTREMITY WITH FAT LAYER EXPOSED (HCC): ICD-10-CM

## 2025-04-22 DIAGNOSIS — L08.9 WOUND INFECTION: ICD-10-CM

## 2025-04-22 DIAGNOSIS — L97.512 PLANTAR ULCER OF RIGHT FOOT WITH FAT LAYER EXPOSED (HCC): ICD-10-CM

## 2025-04-22 PROCEDURE — 11042 DBRDMT SUBQ TIS 1ST 20SQCM/<: CPT

## 2025-04-22 PROCEDURE — 11046 DBRDMT MUSC&/FSCA EA ADDL: CPT

## 2025-04-22 PROCEDURE — 11043 DBRDMT MUSC&/FSCA 1ST 20/<: CPT

## 2025-04-22 NOTE — PROGRESS NOTES
[facility]   Wound Care and Hyperbaric Oxygen Therapy Center   Medical Staff Note     Jackie Yuan  MEDICAL RECORD NUMBER:  [id number]  AGE: 60 y.o.   GENDER: female  : 1965  EPISODE DATE:  2025    Chief complaint and reason for visit:     Chief Complaint   Patient presents with    Wound Check     Bilateral legs      Patient presenting for evaluation of wound(s) per chief complaint.      60 yo F with endometrial cancer and prediabetes presents with multiple lower extremity wounds. She had a reaction to chemotherapy medication Keytruda which resulted in the wounds on the upper thighs.      Continued intermittent wound pain.  No wound erythema.  Minimal wound drainage.  Compliant with dressing changes.  She has been working from home and able to keep her legs elevated.  Keeping legs elevated has decreased leg swelling and wound discharge.  Vascular arteriogram needs to be rescheduled because insurance authorization did not come through.  She is waiting to hear from vascular about rescheduling.    History of Wound Context: Per original history and physical on this patient. 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 Presumed arterial. Measuring smaller    Left dorsal foot ulcer: : Chronic. Presumed arterial. Measuring smaller    Right medial ankle ulcer: : Chronic. Presumed arterial. Measuring larger    Left

## 2025-04-22 NOTE — DISCHARGE INSTRUCTIONS
Wound Clinic Physician Orders and Discharge Instructions  [facility]  [address], [address]  [place] [address]  Telephone: [phone]     FAX [phone]    NAME:  Jackie Yuan  YOB: 1965  MEDICAL RECORD NUMBER:  [id number]  DATE:  4/22/2025      Return Appointment:  [] Dressing Supply Provider: Shane  [] Home Healthcare:  [] Facility:   [x] Return Appointment: 1 Week(s)  [] Nurse Visit:  Week(s)    [] Discharge from [facility]:   [] Healed          [] Refer to Provider:           [] Consult    Follow-up Information:  [] Ordered Tests:   [x] Referrals: [facility] (Dr. Blankenship reviewed biopsy results) 04/25/25 & Dr. Garcia (vascular) - Patient to make and keep follow-ups - Angio planned but waiting on insurance approval   [] Rx:   [] Culture:  [] Wound Vac:  [] Skin Sub:   [] OR:  [] Lymphedema Pumps:  [] Other:     Wound Cleansing:   Do not scrub or use excessive force.  Cleanse wound prior to applying a clean dressing with:  [x] Normal Saline   [] Keep Wound Dry in Shower     [] Wound Cleanser   [x] Cleanse wound with Mild Soap & Water    [] Other:       Topical Treatments:  Do not apply lotions, creams, or ointments to wound bed unless directed.   [] Apply moisturizing lotion to skin surrounding the wound prior to dressing change.  [] Apply antifungal ointment to skin surrounding the wound prior to dressing change.  [] Apply thin film of moisture barrier ointment to skin immediately around wound.  [] Apply Betadine to skin immediately around wound   [] Other:      Dressings:           Wound Location: Right ankle, Left foot, Left Ankle, Left leg, and Right leg    [x] Apply Primary Dressing:       [] MediHoney Gel [] MediHoney Alginate  [] Calcium Alginate with Silver   [] Calcium Alginate without silver   [] Collagen with silver [] Collagen without Silver    [] Santyl with moist saline gauze     [] Hydrofera Blue (cut to size and

## 2025-04-22 NOTE — WOUND CARE
Wound Clinic Physician Orders and Discharge Instructions  [facility]  [address], [address]  [place] [address]  Telephone: [phone]     FAX [phone]    NAME:  Jackie Yuan  YOB: 1965  MEDICAL RECORD NUMBER:  [id number]  DATE:  4/22/2025      Return Appointment:  [] Dressing Supply Provider: Shane  [] Home Healthcare:  [] Facility:   [x] Return Appointment: 1 Week(s)  [] Nurse Visit:  Week(s)    [] Discharge from [facility]:   [] Healed          [] Refer to Provider:           [] Consult    Follow-up Information:  [] Ordered Tests:   [x] Referrals: [facility] (Dr. Blankenship reviewed biopsy results) 04/25/25 & Dr. Garcia (vascular) - Patient to make and keep follow-ups - Angio planned but waiting on insurance approval   [] Rx:   [] Culture:  [] Wound Vac:  [] Skin Sub:   [] OR:  [] Lymphedema Pumps:  [] Other:     Wound Cleansing:   Do not scrub or use excessive force.  Cleanse wound prior to applying a clean dressing with:  [x] Normal Saline   [] Keep Wound Dry in Shower     [] Wound Cleanser   [x] Cleanse wound with Mild Soap & Water    [] Other:       Topical Treatments:  Do not apply lotions, creams, or ointments to wound bed unless directed.   [] Apply moisturizing lotion to skin surrounding the wound prior to dressing change.  [] Apply antifungal ointment to skin surrounding the wound prior to dressing change.  [] Apply thin film of moisture barrier ointment to skin immediately around wound.  [] Apply Betadine to skin immediately around wound   [] Other:      Dressings:           Wound Location: Right ankle, Left foot, Left Ankle, Left leg, and Right leg    [x] Apply Primary Dressing:       [] MediHoney Gel [] MediHoney Alginate  [] Calcium Alginate with Silver   [] Calcium Alginate without silver   [] Collagen with silver [] Collagen without Silver    [] Santyl with moist saline gauze     [] Hydrofera Blue (cut to size and

## 2025-04-29 ENCOUNTER — HOSPITAL ENCOUNTER (OUTPATIENT)
Facility: HOSPITAL | Age: 60
Discharge: HOME | End: 2025-04-29
Attending: SURGERY
Payer: COMMERCIAL

## 2025-04-29 VITALS
RESPIRATION RATE: 18 BRPM | SYSTOLIC BLOOD PRESSURE: 107 MMHG | HEART RATE: 111 BPM | DIASTOLIC BLOOD PRESSURE: 59 MMHG | TEMPERATURE: 97.9 F

## 2025-04-29 DIAGNOSIS — L97.523 CHRONIC FOOT ULCER, LEFT, WITH NECROSIS OF MUSCLE (HCC): ICD-10-CM

## 2025-04-29 DIAGNOSIS — S96.902D OPEN WOUND OF LEFT FOOT WITH TENDON INVOLVEMENT, SUBSEQUENT ENCOUNTER: ICD-10-CM

## 2025-04-29 DIAGNOSIS — L97.922 CHRONIC ULCER OF LEFT LOWER EXTREMITY WITH FAT LAYER EXPOSED (HCC): ICD-10-CM

## 2025-04-29 DIAGNOSIS — S91.302A OPEN WOUND OF LEFT FOOT WITH TENDON INVOLVEMENT, INITIAL ENCOUNTER: ICD-10-CM

## 2025-04-29 DIAGNOSIS — S91.301D OPEN WOUND OF RIGHT FOOT, SUBSEQUENT ENCOUNTER: ICD-10-CM

## 2025-04-29 DIAGNOSIS — S96.902A OPEN WOUND OF LEFT FOOT WITH TENDON INVOLVEMENT, INITIAL ENCOUNTER: ICD-10-CM

## 2025-04-29 DIAGNOSIS — L97.912 CHRONIC ULCER OF RIGHT LOWER EXTREMITY WITH FAT LAYER EXPOSED (HCC): ICD-10-CM

## 2025-04-29 DIAGNOSIS — S91.302D OPEN WOUND OF LEFT FOOT WITH TENDON INVOLVEMENT, SUBSEQUENT ENCOUNTER: ICD-10-CM

## 2025-04-29 DIAGNOSIS — R73.03 PREDIABETES: ICD-10-CM

## 2025-04-29 DIAGNOSIS — L97.322 CHRONIC ULCER OF LEFT ANKLE WITH FAT LAYER EXPOSED (HCC): ICD-10-CM

## 2025-04-29 DIAGNOSIS — L97.313: ICD-10-CM

## 2025-04-29 DIAGNOSIS — L97.512 PLANTAR ULCER OF RIGHT FOOT WITH FAT LAYER EXPOSED (HCC): ICD-10-CM

## 2025-04-29 DIAGNOSIS — L97.522 PLANTAR ULCER OF LEFT FOOT WITH FAT LAYER EXPOSED (HCC): ICD-10-CM

## 2025-04-29 DIAGNOSIS — S91.301A OPEN WOUND OF RIGHT FOOT, INITIAL ENCOUNTER: ICD-10-CM

## 2025-04-29 PROCEDURE — 87205 SMEAR GRAM STAIN: CPT

## 2025-04-29 PROCEDURE — 11043 DBRDMT MUSC&/FSCA 1ST 20/<: CPT

## 2025-04-29 PROCEDURE — 87070 CULTURE OTHR SPECIMN AEROBIC: CPT

## 2025-04-29 PROCEDURE — 11046 DBRDMT MUSC&/FSCA EA ADDL: CPT

## 2025-04-29 PROCEDURE — 11042 DBRDMT SUBQ TIS 1ST 20SQCM/<: CPT

## 2025-04-29 ASSESSMENT — PAIN DESCRIPTION - ORIENTATION: ORIENTATION: RIGHT

## 2025-04-29 ASSESSMENT — PAIN DESCRIPTION - LOCATION: LOCATION: LEG

## 2025-04-29 ASSESSMENT — PAIN SCALES - GENERAL: PAINLEVEL_OUTOF10: 9

## 2025-04-29 NOTE — DISCHARGE INSTRUCTIONS
Santyl with moist saline gauze     [] Hydrofera Blue (cut to size and moistened with normal saline)  [] Hydrofera Blue Ready (cut to size)      [] Normal Saline wet to dry  [] Betadine wet to dry [] Betadine to obdulia wound   [] Hydrogel  [] Mepitel  [] Xeroform    [x] Adaptic - as needed to exposed structures    [] Iodoform Packing Strip [] Plain Packing Strip   [] Skin Sub:    [] Mepilex Transfer  [] Other:      [x] Cover and Secure with:     [x] Gauze             [x] Heidy Or [x] Kerlix   [] Foam Piece [] Foam Heel Cup     [] Super Absorbant   [] ABD  [] Ace Wrap             [] Bordered Gauze Dressing      [] Mepilex Bordered Foam Dressing      [] Surgilast     [] Other:    Limit contact of tape with skin.    [x] Change dressing: [] Daily    [x] Every Other Day   [] Twice per week   [] Three times per week   [] Once a week [] Do Not Change Dressing   [] Other:    Dressings:           Wound Location: Left dorsal foot    [x] Apply Primary Dressing:       [] MediHoney Gel [] MediHoney Alginate  [] Calcium Alginate with Silver   [x] Calcium Alginate without silver - 3rd   [] Collagen with silver [x] Collagen without Silver - 2nd   [] Santyl with moist saline gauze     [] Hydrofera Blue (cut to size and moistened with normal saline)  [] Hydrofera Blue Ready (cut to size)      [] Normal Saline wet to dry  [] Betadine wet to dry [] Betadine to obdulia wound   [] Hydrogel  [] Mepitel  [] Xeroform    [x] Adaptic (to tendon) - 1st   [] Iodoform Packing Strip [] Plain Packing Strip   [] Skin Sub:    [] Mepilex Transfer  [] Other:      [x] Cover and Secure with:     [x] Gauze             [x] Heidy Or [x] Kerlix   [] Foam Piece [] Foam Heel Cup     [] Super Absorbant   [] ABD  [] Ace Wrap             [] Bordered Gauze Dressing      [] Mepilex Bordered Foam Dressing      [] Surgilast     [] Other:    Limit contact of tape with skin.    [x] Change dressing: [] Daily    [x] Every Other Day   [] Twice per week   [] Three times per

## 2025-04-29 NOTE — WOUND CARE
room if there is a foul odor or becomes uncomfortable due to feeling tight or swelling.  Do not use objects inside of cast to scratch.      Dietary:  [] Diet as tolerated: [] Calorie Diabetic Diet: [x] No Added Salt:  [x] Increase Protein: [] Other:     Activity:  [] Activity as tolerated:    [] Patient has no activity restrictions      [] Strict Bedrest   [] Remain off Work      [] May return to full duty work                                     [x] Return to work with restrictions: Elevate legs as much as possible.     Physician:  [] Dr. Maribel Padilla  [] Jake Simmons PA-C  [] Dr. Christine Brown  [] Dr. Juan Jacobo  [] Dr. Gne Muñoz  [] Dr. Aileen Alvarez  [x] Dr. Shaila Blankenship      Nurse Case Manger:  Carissa KESSLER RN      Wound Care Center Information: Should you experience any significant changes in your wound(s) or have questions about your wound care, please contact the Wound Center at [phone]. Our hours are Monday - Friday 8am - 4:30pm, closed all major holidays. If you need help with your wound outside these hours and cannot wait until we are again available, contact your PCP or go to the hospital emergency room.     PLEASE NOTE: IF YOU ARE UNABLE TO OBTAIN WOUND SUPPLIES, CONTINUE TO USE THE SUPPLIES YOU HAVE AVAILABLE UNTIL YOU ARE ABLE TO REACH US. IT IS MOST IMPORTANT TO KEEP THE WOUND COVERED AT ALL TIMES.     
(cm) 0.2 cm 04/29/25 1424   Post-Procedure Surface Area (cm^2) 14.04 cm^2 04/29/25 1424   Post-Procedure Volume (cm^3) 2.808 cm^3 04/29/25 1424   Wound Assessment Slough;Granulation tissue 04/29/25 1408   Drainage Amount Moderate (25-50%) 04/29/25 1408   Drainage Description Serosanguinous 04/29/25 1408   Odor None 04/29/25 1408   Edwige-wound Assessment Dry/flaky;Hyperkeratosis (callous);Maceration 04/29/25 1408   Margins Attached edges 04/29/25 1408   Wound Thickness Description not for Pressure Injury Full thickness 04/29/25 1408   Number of days: 42          Supplies Requested :      WOUND #: 1, 2, 3, 4, 5, 6, and 7   PRIMARY DRESSING:  Alginate pad, Collagen , Other: adaptic    Cover and Secure with: 4X4 non woven gauze pad  ABD pad  Bulky roll gauze  Other tubigrip size E     FREQUENCY OF DRESSING CHANGES:  Every other day         ADDITIONAL ITEMS:  [] Gloves:   [] Small [] Medium [] Large  [x] Tape 4\" Medipore  [] Normal Saline  [] Skin Prep   [] Adhesive Remover   [] Cotton/Foam Tip Applicators  [] Tongue Depressor   [] Other:    Patient Wound(s) Debrided: [x] Yes. Date last debrided  4/29/25   [] No    Debribement Type: Excisional/Sharp    Patient currently being seen by Home Health: [] Yes   [x] No    Duration for needed supplies:  []15  [x]30  []60  []90 Days    Electronically signed by Kenisha Trent RN on 4/29/2025 at 2:38 PM     Provider Information:      PROVIDER'S NAME: Dr. Shaila Blankenship

## 2025-04-29 NOTE — PROGRESS NOTES
Dressing/Treatment Collagen;Alginate;Gauze dressing/dressing sponge;Dry dressing 04/29/25 1436   Wound Length (cm) 6.6 cm 04/29/25 1403   Wound Width (cm) 3.5 cm 04/29/25 1403   Wound Depth (cm) 0.1 cm 04/29/25 1403   Wound Surface Area (cm^2) 23.1 cm^2 04/29/25 1403   Change in Wound Size % (l*w) -208 04/29/25 1403   Wound Volume (cm^3) 2.31 cm^3 04/29/25 1403   Wound Healing % -208 04/29/25 1403   Post-Procedure Length (cm) 6.7 cm 04/29/25 1424   Post-Procedure Width (cm) 3.6 cm 04/29/25 1424   Post-Procedure Depth (cm) 0.2 cm 04/29/25 1424   Post-Procedure Surface Area (cm^2) 24.12 cm^2 04/29/25 1424   Post-Procedure Volume (cm^3) 4.824 cm^3 04/29/25 1424   Wound Assessment Slough;Granulation tissue;Exposed structure muscle;Exposed structure tendon 04/29/25 1403   Drainage Amount Moderate (25-50%) 04/29/25 1403   Drainage Description Serosanguinous 04/29/25 1403   Odor None 04/29/25 1403   Edwige-wound Assessment Hyperkeratosis (callous);Maceration 04/29/25 1403   Margins Attached edges 04/29/25 1403   Wound Thickness Description not for Pressure Injury Full thickness 04/29/25 1403   Number of days: 62       Wound 02/26/25 Ankle Lateral;Left #4 (Active)   Wound Image   04/29/25 1405   Wound Etiology Arterial 04/29/25 1405   Dressing Status Clean;Dry;Intact 04/29/25 1405   Wound Cleansed Cleansed with saline 04/29/25 1405   Dressing/Treatment Collagen;Alginate;Gauze dressing/dressing sponge;Dry dressing 04/29/25 1436   Wound Length (cm) 6.7 cm 04/29/25 1405   Wound Width (cm) 6.3 cm 04/29/25 1405   Wound Depth (cm) 0.1 cm 04/29/25 1405   Wound Surface Area (cm^2) 42.21 cm^2 04/29/25 1405   Change in Wound Size % (l*w) -111.05 04/29/25 1405   Wound Volume (cm^3) 4.221 cm^3 04/29/25 1405   Wound Healing % -111 04/29/25 1405   Post-Procedure Length (cm) 6.8 cm 04/29/25 1424   Post-Procedure Width (cm) 6.4 cm 04/29/25 1424   Post-Procedure Depth (cm) 0.2 cm 04/29/25 1424   Post-Procedure Surface Area (cm^2) 43.52 cm^2

## 2025-05-01 LAB
BACTERIA SPEC CULT: NORMAL
GRAM STN SPEC: NORMAL
GRAM STN SPEC: NORMAL
Lab: NORMAL

## 2025-05-06 ENCOUNTER — HOSPITAL ENCOUNTER (OUTPATIENT)
Facility: HOSPITAL | Age: 60
Discharge: HOME | End: 2025-05-06
Attending: SURGERY
Payer: COMMERCIAL

## 2025-05-06 VITALS
DIASTOLIC BLOOD PRESSURE: 44 MMHG | SYSTOLIC BLOOD PRESSURE: 117 MMHG | HEART RATE: 104 BPM | RESPIRATION RATE: 18 BRPM | TEMPERATURE: 98.2 F

## 2025-05-06 DIAGNOSIS — L97.313: ICD-10-CM

## 2025-05-06 DIAGNOSIS — L97.523 CHRONIC FOOT ULCER, LEFT, WITH NECROSIS OF MUSCLE (HCC): ICD-10-CM

## 2025-05-06 DIAGNOSIS — S91.301A OPEN WOUND OF RIGHT FOOT, INITIAL ENCOUNTER: ICD-10-CM

## 2025-05-06 DIAGNOSIS — L97.922 CHRONIC ULCER OF LEFT LOWER EXTREMITY WITH FAT LAYER EXPOSED (HCC): ICD-10-CM

## 2025-05-06 DIAGNOSIS — S91.302D OPEN WOUND OF LEFT FOOT WITH TENDON INVOLVEMENT, SUBSEQUENT ENCOUNTER: ICD-10-CM

## 2025-05-06 DIAGNOSIS — S91.301D OPEN WOUND OF RIGHT FOOT, SUBSEQUENT ENCOUNTER: ICD-10-CM

## 2025-05-06 DIAGNOSIS — L97.912 CHRONIC ULCER OF RIGHT LOWER EXTREMITY WITH FAT LAYER EXPOSED (HCC): ICD-10-CM

## 2025-05-06 DIAGNOSIS — R73.03 PREDIABETES: ICD-10-CM

## 2025-05-06 DIAGNOSIS — S96.902D OPEN WOUND OF LEFT FOOT WITH TENDON INVOLVEMENT, SUBSEQUENT ENCOUNTER: ICD-10-CM

## 2025-05-06 DIAGNOSIS — S96.902A OPEN WOUND OF LEFT FOOT WITH TENDON INVOLVEMENT, INITIAL ENCOUNTER: ICD-10-CM

## 2025-05-06 DIAGNOSIS — L97.512 PLANTAR ULCER OF RIGHT FOOT WITH FAT LAYER EXPOSED (HCC): ICD-10-CM

## 2025-05-06 DIAGNOSIS — L97.522 PLANTAR ULCER OF LEFT FOOT WITH FAT LAYER EXPOSED (HCC): ICD-10-CM

## 2025-05-06 DIAGNOSIS — S91.302A OPEN WOUND OF LEFT FOOT WITH TENDON INVOLVEMENT, INITIAL ENCOUNTER: ICD-10-CM

## 2025-05-06 DIAGNOSIS — L97.322 CHRONIC ULCER OF LEFT ANKLE WITH FAT LAYER EXPOSED (HCC): ICD-10-CM

## 2025-05-06 PROCEDURE — 99214 OFFICE O/P EST MOD 30 MIN: CPT

## 2025-05-06 NOTE — DISCHARGE INSTRUCTIONS
Wound Clinic Physician Orders and Discharge Instructions  [facility]  [address], [address]  [place] [address]  Telephone: [phone]     FAX [phone]    NAME:  Jackie Yuan  YOB: 1965  MEDICAL RECORD NUMBER:  [id number]  DATE:  5/6/2025      Return Appointment:  [x] Dressing Supply Provider: Shane  [] Home Healthcare:  [] Facility:   [x] Return Appointment: 1 Week(s)  [] Nurse Visit:  Week(s)    [] Discharge from [facility]:   [] Healed          [] Refer to Provider:           [] Consult    Follow-up Information:  [] Ordered Tests:   [x] Referrals: [facility] (Dr. Blankenship reviewed biopsy results) - 06/2025 & Dr. Garcia (vascular) - Patient to make and keep follow-ups - Angio 05/12/2025   [] Rx:   [] Culture:  [] Wound Vac:  [] Skin Sub:   [] OR:  [] Lymphedema Pumps:  [] Other:     Wound Cleansing:   Do not scrub or use excessive force.  Cleanse wound prior to applying a clean dressing with:  [x] Normal Saline   [] Keep Wound Dry in Shower     [] Wound Cleanser   [x] Cleanse wound with Mild Soap & Water    [] Other:       Topical Treatments:  Do not apply lotions, creams, or ointments to wound bed unless directed.   [] Apply moisturizing lotion to skin surrounding the wound prior to dressing change.  [] Apply antifungal ointment to skin surrounding the wound prior to dressing change.  [] Apply thin film of moisture barrier ointment to skin immediately around wound.  [] Apply Betadine to skin immediately around wound   [] Other:      Dressings:           Wound Location: Right ankle, Left foot, Left Ankle, Left leg, and Right leg    [x] Apply Primary Dressing:       [] MediHoney Gel [] MediHoney Alginate  [] Calcium Alginate with Silver   [] Calcium Alginate without silver    [] Collagen with silver [x] Collagen without Silver - 1st   [] Santyl with moist saline gauze     [] Hydrofera Blue (cut to size and moistened with normal saline)

## 2025-05-06 NOTE — WOUND CARE
Wound Care Supplies      Supply Company:     [employer] [address] [place] [address] f: [phone] f: [phone] p: [phone] [email or web address]      Ordering Center:     [facility]  [address] [place] [address], [address] [place], [place] [address]    Phone: [phone]  Fax: [phone]    Patient Information:      Jackie Yuan  [address] [facility] [id number] [phone]   : 1965  AGE: 60 y.o.     GENDER: female   EPISODE DATE: 2025    Insurance:      PRIMARY INSURANCE:  [facility]  Coverage: [place]  Effective Date: 2025  Group Number: [unfilled]  Subscriber Number: [id number] - ([facility])    Payer/Plan Subscr  Sex Relation Sub. Ins. ID Effective Group Num   1. [place]T* CHANTELLE YUAN 1955 Male Spouse [id number]                                   [address] [address]       Patient Wound Information:      Problem List Items Addressed This Visit          Musculoskeletal and Integument    Open wound of left foot with tendon involvement    Relevant Medications    lidocaine 4 % jelly    Other Relevant Orders    Initiate Outpatient Wound Care Protocol       Other    BMI 60.0-69.9, adult (HCC) - Primary    Relevant Medications    lidocaine 4 % jelly    Other Relevant Orders    Initiate Outpatient Wound Care Protocol    Prediabetes    Relevant Medications    lidocaine 4 % jelly    Other Relevant Orders    Initiate Outpatient Wound Care Protocol    Open wound of right foot    Relevant Medications    lidocaine 4 % jelly    Other Relevant Orders    Initiate Outpatient Wound Care Protocol    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

## 2025-05-06 NOTE — PROGRESS NOTES
[facility]   Wound Care and Hyperbaric Oxygen Therapy Center   Medical Staff Note     Jackie Yuan  MEDICAL RECORD NUMBER:  [id number]  AGE: 60 y.o.   GENDER: female  : 1965  EPISODE DATE:  2025    Chief complaint and reason for visit:     Chief Complaint   Patient presents with    Wound Check     Bilateral legs and feet      Patient presenting for evaluation of wound(s) per chief complaint.      60 yo F with endometrial cancer and prediabetes presents with multiple lower extremity wounds. She had a reaction to chemotherapy medication Keytruda which resulted in the wounds on the upper thighs.      Wound pain is intermittent and at baseline. No wound erythema.  Minimal wound drainage.  Compliant with dressing changes - she had to go back to the Mepilex because the alginate soak through in matter of hours and fell off.  She is working from home on Tue and Thur. At work she alternates which leg she is keeping elevated.  Keeping legs elevated has decreased leg swelling and wound discharge.     History of Wound Context: Per original history and physical on this patient. Changes in history since last evaluation: none      Medical Decision Making:     Problem List Items Addressed This Visit          Musculoskeletal and Integument    Open wound of left foot with tendon involvement    Relevant Medications    lidocaine 4 % jelly    Other Relevant Orders    Initiate Outpatient Wound Care Protocol       Other    BMI 60.0-69.9, adult (HCC) - Primary    Relevant Medications    lidocaine 4 % jelly    Other Relevant Orders    Initiate Outpatient Wound Care Protocol    Prediabetes    Relevant Medications    lidocaine 4 % jelly    Other Relevant Orders    Initiate Outpatient Wound Care Protocol    Open wound of right foot    Relevant Medications    lidocaine 4 % jelly    Other Relevant Orders    Initiate Outpatient Wound Care Protocol    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

## 2025-05-06 NOTE — WOUND CARE
Wound Clinic Physician Orders and Discharge Instructions  [facility]  [address], [address]  [place] [address]  Telephone: [phone]     FAX [phone]    NAME:  Jackie Yuan  YOB: 1965  MEDICAL RECORD NUMBER:  [id number]  DATE:  5/6/2025      Return Appointment:  [x] Dressing Supply Provider: Shane  [] Home Healthcare:  [] Facility:   [x] Return Appointment: 1 Week(s)  [] Nurse Visit:  Week(s)    [] Discharge from [facility]:   [] Healed          [] Refer to Provider:           [] Consult    Follow-up Information:  [] Ordered Tests:   [x] Referrals: [facility] (Dr. Blankenship reviewed biopsy results) - 06/2025 & Dr. Garcia (vascular) - Patient to make and keep follow-ups - Angio 05/12/2025   [] Rx:   [] Culture:  [] Wound Vac:  [] Skin Sub:   [] OR:  [] Lymphedema Pumps:  [] Other:     Wound Cleansing:   Do not scrub or use excessive force.  Cleanse wound prior to applying a clean dressing with:  [x] Normal Saline   [] Keep Wound Dry in Shower     [] Wound Cleanser   [x] Cleanse wound with Mild Soap & Water    [] Other:       Topical Treatments:  Do not apply lotions, creams, or ointments to wound bed unless directed.   [] Apply moisturizing lotion to skin surrounding the wound prior to dressing change.  [] Apply antifungal ointment to skin surrounding the wound prior to dressing change.  [] Apply thin film of moisture barrier ointment to skin immediately around wound.  [] Apply Betadine to skin immediately around wound   [] Other:      Dressings:           Wound Location: Right ankle, Left foot, Left Ankle, Left leg, and Right leg    [x] Apply Primary Dressing:       [] MediHoney Gel [] MediHoney Alginate  [] Calcium Alginate with Silver   [] Calcium Alginate without silver    [] Collagen with silver [x] Collagen without Silver - 1st   [] Santyl with moist saline gauze     [] Hydrofera Blue (cut to size and moistened with normal saline)

## 2025-05-07 RX ORDER — CETIRIZINE HYDROCHLORIDE 10 MG/1
TABLET ORAL
Qty: 90 TABLET | Refills: 1 | Status: SHIPPED | OUTPATIENT
Start: 2025-05-07

## 2025-05-09 NOTE — WOUND CARE
05/09/25 - Called Dr. Garcia's office to make sure it was ok for patient to be seen here, 05/13, for her follow-up after her procedure on 05/12. Spoke with Afua and told her that we know sometimes patients are in pressure dressings that have to stay on for 24/48 hours. Afua confirmed the patient was ok to be seen. No further questions at this time.

## 2025-05-13 ENCOUNTER — HOSPITAL ENCOUNTER (OUTPATIENT)
Facility: HOSPITAL | Age: 60
Discharge: HOME | End: 2025-05-13
Attending: SURGERY
Payer: COMMERCIAL

## 2025-05-13 VITALS
SYSTOLIC BLOOD PRESSURE: 109 MMHG | HEART RATE: 105 BPM | DIASTOLIC BLOOD PRESSURE: 55 MMHG | RESPIRATION RATE: 18 BRPM | TEMPERATURE: 97 F

## 2025-05-13 DIAGNOSIS — S96.902D OPEN WOUND OF LEFT FOOT WITH TENDON INVOLVEMENT, SUBSEQUENT ENCOUNTER: ICD-10-CM

## 2025-05-13 DIAGNOSIS — L97.512 PLANTAR ULCER OF RIGHT FOOT WITH FAT LAYER EXPOSED (HCC): ICD-10-CM

## 2025-05-13 DIAGNOSIS — L97.322 CHRONIC ULCER OF LEFT ANKLE WITH FAT LAYER EXPOSED (HCC): ICD-10-CM

## 2025-05-13 DIAGNOSIS — S91.302D OPEN WOUND OF LEFT FOOT WITH TENDON INVOLVEMENT, SUBSEQUENT ENCOUNTER: ICD-10-CM

## 2025-05-13 DIAGNOSIS — L97.922 CHRONIC ULCER OF LEFT LOWER EXTREMITY WITH FAT LAYER EXPOSED (HCC): ICD-10-CM

## 2025-05-13 DIAGNOSIS — L97.522 PLANTAR ULCER OF LEFT FOOT WITH FAT LAYER EXPOSED (HCC): ICD-10-CM

## 2025-05-13 DIAGNOSIS — L97.313: ICD-10-CM

## 2025-05-13 DIAGNOSIS — S96.902A OPEN WOUND OF LEFT FOOT WITH TENDON INVOLVEMENT, INITIAL ENCOUNTER: ICD-10-CM

## 2025-05-13 DIAGNOSIS — L97.912 CHRONIC ULCER OF RIGHT LOWER EXTREMITY WITH FAT LAYER EXPOSED (HCC): ICD-10-CM

## 2025-05-13 DIAGNOSIS — S91.302A OPEN WOUND OF LEFT FOOT WITH TENDON INVOLVEMENT, INITIAL ENCOUNTER: ICD-10-CM

## 2025-05-13 DIAGNOSIS — L97.523 CHRONIC FOOT ULCER, LEFT, WITH NECROSIS OF MUSCLE (HCC): ICD-10-CM

## 2025-05-13 DIAGNOSIS — S91.301A OPEN WOUND OF RIGHT FOOT, INITIAL ENCOUNTER: ICD-10-CM

## 2025-05-13 DIAGNOSIS — S91.301D OPEN WOUND OF RIGHT FOOT, SUBSEQUENT ENCOUNTER: ICD-10-CM

## 2025-05-13 DIAGNOSIS — R73.03 PREDIABETES: ICD-10-CM

## 2025-05-13 PROCEDURE — 11046 DBRDMT MUSC&/FSCA EA ADDL: CPT

## 2025-05-13 PROCEDURE — 11043 DBRDMT MUSC&/FSCA 1ST 20/<: CPT

## 2025-05-13 NOTE — DISCHARGE INSTRUCTIONS
Wound Clinic Physician Orders and Discharge Instructions  [facility]  [address], [address]  [place] [address]  Telephone: [phone]     FAX [phone]    NAME:  Jackie Yuan  YOB: 1965  MEDICAL RECORD NUMBER:  [id number]  DATE:  5/13/2025      Return Appointment:  [] Dressing Supply Provider: Shane  [] Home Healthcare:  [] Facility:   [x] Return Appointment: 1 Week(s)  [] Nurse Visit:  Week(s)    [] Discharge from [facility]:   [] Healed          [] Refer to Provider:           [] Consult    Follow-up Information:  [] Ordered Tests:   [x] Referrals: [facility] (Dr. Blankenship reviewed biopsy results) - 06/2025 & Dr. Garcia (vascular) - Patient to make and keep up with follow-ups  [] Rx:   [] Culture:  [] Wound Vac:  [x] Skin Sub: Considering   [] OR:  [] Lymphedema Pumps:  [] Other:     Wound Cleansing:   Do not scrub or use excessive force.  Cleanse wound prior to applying a clean dressing with:  [x] Normal Saline   [] Keep Wound Dry in Shower     [] Wound Cleanser   [x] Cleanse wound with Mild Soap & Water    [] Other:       Topical Treatments:  Do not apply lotions, creams, or ointments to wound bed unless directed.   [] Apply moisturizing lotion to skin surrounding the wound prior to dressing change.  [] Apply antifungal ointment to skin surrounding the wound prior to dressing change.  [] Apply thin film of moisture barrier ointment to skin immediately around wound.  [] Apply Betadine to skin immediately around wound   [] Other:      Dressings:           Wound Location: Right ankle, Left foot, Left Ankle, Left leg, and Right leg    [x] Apply Primary Dressing:       [] MediHoney Gel [] MediHoney Alginate  [] Calcium Alginate with Silver   [] Calcium Alginate without silver    [] Collagen with silver [x] Collagen without Silver - 1st   [] Santyl with moist saline gauze     [] Hydrofera Blue (cut to size and moistened with normal saline)

## 2025-05-13 NOTE — WOUND CARE
Wound Clinic Physician Orders and Discharge Instructions  [facility]  [address], [address]  [place] [address]  Telephone: [phone]     FAX [phone]    NAME:  Jackie Yuan  YOB: 1965  MEDICAL RECORD NUMBER:  [id number]  DATE:  5/13/2025      Return Appointment:  [] Dressing Supply Provider: Shane  [] Home Healthcare:  [] Facility:   [x] Return Appointment: 1 Week(s)  [] Nurse Visit:  Week(s)    [] Discharge from [facility]:   [] Healed          [] Refer to Provider:           [] Consult    Follow-up Information:  [] Ordered Tests:   [x] Referrals: [facility] (Dr. Blankenship reviewed biopsy results) - 06/2025 & Dr. Garcia (vascular) - Patient to make and keep up with follow-ups  [] Rx:   [] Culture:  [] Wound Vac:  [x] Skin Sub: Considering   [] OR:  [] Lymphedema Pumps:  [] Other:     Wound Cleansing:   Do not scrub or use excessive force.  Cleanse wound prior to applying a clean dressing with:  [x] Normal Saline   [] Keep Wound Dry in Shower     [] Wound Cleanser   [x] Cleanse wound with Mild Soap & Water    [] Other:       Topical Treatments:  Do not apply lotions, creams, or ointments to wound bed unless directed.   [] Apply moisturizing lotion to skin surrounding the wound prior to dressing change.  [] Apply antifungal ointment to skin surrounding the wound prior to dressing change.  [] Apply thin film of moisture barrier ointment to skin immediately around wound.  [] Apply Betadine to skin immediately around wound   [] Other:      Dressings:           Wound Location: Right ankle, Left foot, Left Ankle, Left leg, and Right leg    [x] Apply Primary Dressing:       [] MediHoney Gel [] MediHoney Alginate  [] Calcium Alginate with Silver   [] Calcium Alginate without silver    [] Collagen with silver [x] Collagen without Silver - 1st   [] Santyl with moist saline gauze     [] Hydrofera Blue (cut to size and moistened with normal saline)

## 2025-05-13 NOTE — PROGRESS NOTES
[facility]   Wound Care and Hyperbaric Oxygen Therapy Center   Medical Staff Note     Jackie Yuan  MEDICAL RECORD NUMBER:  [id number]  AGE: 60 y.o.   GENDER: female  : 1965  EPISODE DATE:  2025    Chief complaint and reason for visit:     Chief Complaint   Patient presents with    Wound Check     Bilateral legs and feet      Patient presenting for evaluation of wound(s) per chief complaint.      58 yo F with endometrial cancer and prediabetes presents with multiple lower extremity wounds. She had a reaction to chemotherapy medication Keytruda which resulted in the wounds on the upper thighs.      Wound pain is intermittent and at baseline. No wound erythema.  Minimal wound drainage.  Compliant with dressing changes - she had to go back to the Mepilex because the alginate soak through in matter of hours and fell off.  She is working from home on Tue and Thur. At work she alternates which leg she is keeping elevated.  Keeping legs elevated has decreased leg swelling and wound discharge.     Arteriogram was done yesterday and did not find any blockages.     History of Wound Context: Per original history and physical on this patient. 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

## 2025-05-20 ENCOUNTER — HOSPITAL ENCOUNTER (OUTPATIENT)
Facility: HOSPITAL | Age: 60
Discharge: HOME | End: 2025-05-20
Attending: SURGERY
Payer: COMMERCIAL

## 2025-05-20 VITALS
SYSTOLIC BLOOD PRESSURE: 107 MMHG | HEART RATE: 90 BPM | RESPIRATION RATE: 18 BRPM | TEMPERATURE: 97.3 F | DIASTOLIC BLOOD PRESSURE: 63 MMHG

## 2025-05-20 DIAGNOSIS — L97.523 CHRONIC FOOT ULCER, LEFT, WITH NECROSIS OF MUSCLE (HCC): ICD-10-CM

## 2025-05-20 DIAGNOSIS — L97.512 PLANTAR ULCER OF RIGHT FOOT WITH FAT LAYER EXPOSED (HCC): ICD-10-CM

## 2025-05-20 DIAGNOSIS — S91.301A OPEN WOUND OF RIGHT FOOT, INITIAL ENCOUNTER: ICD-10-CM

## 2025-05-20 DIAGNOSIS — L97.322 CHRONIC ULCER OF LEFT ANKLE WITH FAT LAYER EXPOSED (HCC): ICD-10-CM

## 2025-05-20 DIAGNOSIS — S91.302D OPEN WOUND OF LEFT FOOT WITH TENDON INVOLVEMENT, SUBSEQUENT ENCOUNTER: ICD-10-CM

## 2025-05-20 DIAGNOSIS — S91.302A OPEN WOUND OF LEFT FOOT WITH TENDON INVOLVEMENT, INITIAL ENCOUNTER: ICD-10-CM

## 2025-05-20 DIAGNOSIS — S91.301D OPEN WOUND OF RIGHT FOOT, SUBSEQUENT ENCOUNTER: ICD-10-CM

## 2025-05-20 DIAGNOSIS — L97.522 PLANTAR ULCER OF LEFT FOOT WITH FAT LAYER EXPOSED (HCC): ICD-10-CM

## 2025-05-20 DIAGNOSIS — S96.902A OPEN WOUND OF LEFT FOOT WITH TENDON INVOLVEMENT, INITIAL ENCOUNTER: ICD-10-CM

## 2025-05-20 DIAGNOSIS — S96.902D OPEN WOUND OF LEFT FOOT WITH TENDON INVOLVEMENT, SUBSEQUENT ENCOUNTER: ICD-10-CM

## 2025-05-20 DIAGNOSIS — L97.922 CHRONIC ULCER OF LEFT LOWER EXTREMITY WITH FAT LAYER EXPOSED (HCC): ICD-10-CM

## 2025-05-20 DIAGNOSIS — L97.912 CHRONIC ULCER OF RIGHT LOWER EXTREMITY WITH FAT LAYER EXPOSED (HCC): ICD-10-CM

## 2025-05-20 DIAGNOSIS — L97.313: ICD-10-CM

## 2025-05-20 DIAGNOSIS — R73.03 PREDIABETES: ICD-10-CM

## 2025-05-20 PROCEDURE — 11045 DBRDMT SUBQ TISS EACH ADDL: CPT

## 2025-05-20 PROCEDURE — 11042 DBRDMT SUBQ TIS 1ST 20SQCM/<: CPT

## 2025-05-20 PROCEDURE — 11043 DBRDMT MUSC&/FSCA 1ST 20/<: CPT

## 2025-05-20 PROCEDURE — 11046 DBRDMT MUSC&/FSCA EA ADDL: CPT

## 2025-05-20 NOTE — DISCHARGE INSTRUCTIONS
Dressing:       [] MediHoney Gel [] MediHoney Alginate  [] Calcium Alginate with Silver   [] Calcium Alginate without silver   [] Collagen with silver [x] Collagen without Silver - 2nd   [] Santyl with moist saline gauze     [] Hydrofera Blue (cut to size and moistened with normal saline)  [] Hydrofera Blue Ready (cut to size)      [] Normal Saline wet to dry  [] Betadine wet to dry [] Betadine to obdulia wound   [] Hydrogel  [] Mepitel  [] Xeroform    [x] Adaptic (to tendon) - 1st   [] Iodoform Packing Strip [] Plain Packing Strip   [] Skin Sub:    [x] Mepilex Transfer - 3rd  [] Other:      [x] Cover and Secure with:     [x] Gauze             [x] Heidy Or [x] Kerlix   [] Foam Piece [] Foam Heel Cup     [] Super Absorbant   [] ABD  [] Ace Wrap             [] Bordered Gauze Dressing      [] Mepilex Bordered Foam Dressing      [] Surgilast     [] Other:    Limit contact of tape with skin.    [x] Change dressing: [] Daily    [x] Every Other Day   [] Twice per week   [] Three times per week   [] Once a week [] Do Not Change Dressing   [] Other:     Skin Sub:           Wound Location:   [] Implanted on:    [] Only change outer dressing  [] Do not change dressing until:    [] Other:     Negative Pressure:           Wound Location:   [] Pressure @  mm/Hg  []Continuous []Intermittent   [] Black Foam  [] White Foam [] Bridge  [] Change dressing 3 times per week     [] Other:     Pressure Relief:  [] When sitting, shift position or do seat lifts every 15 minutes.  [] Wheelchair cushion [] Specialty Bed/Mattress  [] Turn every 2 hours when in bed.  Avoid direct pressure on wound site.  Limit side lying to 30 degree tilt.  Limit HOB elevation to 30 degrees.  [] Other:     Edema Control:  Apply: [] Compression Stocking:  mmHg  []Right Leg []Left Leg   [x] Tubigrip []Right Leg Double Layer []Left Leg Double Layer      [x]Right Leg Single Layer [x]Left Leg Single Layer   [] Ace Wrap []Right Leg  []Left Leg      [x] Elevate leg(s)

## 2025-05-20 NOTE — WOUND CARE
Wound Clinic Physician Orders and Discharge Instructions  [facility]  [address], [address]  [place] [address]  Telephone: [phone]     FAX [phone]    NAME:  Jackie Yuan  YOB: 1965  MEDICAL RECORD NUMBER:  [id number]  DATE:  5/20/2025      Return Appointment:  [] Dressing Supply Provider: Shane  [] Home Healthcare:  [] Facility:   [x] Return Appointment: 1 Week(s)  [] Nurse Visit:  Week(s)    [] Discharge from [facility]:   [] Healed          [] Refer to Provider:           [] Consult    Follow-up Information:  [] Ordered Tests:   [x] Referrals: [facility] (Dr. Blankenship reviewed biopsy results) - 06/2025 & Dr. Garcia (vascular) - Patient to make and keep up with follow-ups  [] Rx:   [] Culture:  [] Wound Vac:  [] Skin Sub:    [] OR:  [] Lymphedema Pumps:  [] Other:     Wound Cleansing:   Do not scrub or use excessive force.  Cleanse wound prior to applying a clean dressing with:  [x] Normal Saline   [] Keep Wound Dry in Shower     [] Wound Cleanser   [x] Cleanse wound with Mild Soap & Water    [] Other:       Topical Treatments:  Do not apply lotions, creams, or ointments to wound bed unless directed.   [] Apply moisturizing lotion to skin surrounding the wound prior to dressing change.  [] Apply antifungal ointment to skin surrounding the wound prior to dressing change.  [] Apply thin film of moisture barrier ointment to skin immediately around wound.  [] Apply Betadine to skin immediately around wound   [] Other:      Dressings:           Wound Location: Right ankle and Left leg  [x] Apply Primary Dressing:       [] MediHoney Gel [] MediHoney Alginate  [] Calcium Alginate with Silver   [] Calcium Alginate without silver    [] Collagen with silver [] Collagen without Silver - 1st   [] Santyl with moist saline gauze     [] Hydrofera Blue (cut to size and moistened with normal saline)  [] Hydrofera Blue Ready (cut to size)      []

## 2025-05-20 NOTE — PROGRESS NOTES
05/20/25 1322   Wound Healing % -33 05/20/25 1322   Post-Procedure Length (cm) 5.1 cm 05/20/25 1358   Post-Procedure Width (cm) 2.1 cm 05/20/25 1358   Post-Procedure Depth (cm) 0.2 cm 05/20/25 1358   Post-Procedure Surface Area (cm^2) 10.71 cm^2 05/20/25 1358   Post-Procedure Volume (cm^3) 2.142 cm^3 05/20/25 1358   Wound Assessment Granulation tissue;Slough 05/20/25 1322   Drainage Amount Moderate (25-50%) 05/20/25 1322   Drainage Description Serosanguinous 05/20/25 1322   Odor None 05/20/25 1322   Edwige-wound Assessment Hyperpigmented 05/20/25 1322   Margins Attached edges 05/20/25 1322   Wound Thickness Description not for Pressure Injury Full thickness 05/20/25 1322   Number of days: 83       Wound 02/26/25 Ankle Lateral;Left #4 (Active)   Wound Image   05/20/25 1322   Wound Etiology Other 05/20/25 1322   Dressing Status Old drainage noted 05/20/25 1322   Wound Cleansed Cleansed with saline 05/20/25 1322   Dressing/Treatment Collagen;Alginate;Gauze dressing/dressing sponge;Dry dressing 04/29/25 1436   Wound Length (cm) 3 cm 05/20/25 1322   Wound Width (cm) 3.4 cm 05/20/25 1322   Wound Depth (cm) 0.1 cm 05/20/25 1322   Wound Surface Area (cm^2) 10.2 cm^2 05/20/25 1322   Change in Wound Size % (l*w) 49 05/20/25 1322   Wound Volume (cm^3) 1.02 cm^3 05/20/25 1322   Wound Healing % 49 05/20/25 1322   Post-Procedure Length (cm) 3.1 cm 05/20/25 1358   Post-Procedure Width (cm) 3.5 cm 05/20/25 1358   Post-Procedure Depth (cm) 0.2 cm 05/20/25 1358   Post-Procedure Surface Area (cm^2) 10.85 cm^2 05/20/25 1358   Post-Procedure Volume (cm^3) 2.17 cm^3 05/20/25 1358   Wound Assessment Exposed structure muscle 05/20/25 1358   Drainage Amount Moderate (25-50%) 05/20/25 1322   Drainage Description Serosanguinous 05/20/25 1322   Odor None 05/20/25 1322   Edwige-wound Assessment Hyperpigmented 05/20/25 1322   Margins Attached edges 05/20/25 1322   Wound Thickness Description not for Pressure Injury Full thickness 05/20/25 1322

## 2025-05-27 ENCOUNTER — HOSPITAL ENCOUNTER (OUTPATIENT)
Facility: HOSPITAL | Age: 60
Discharge: HOME | End: 2025-05-27
Attending: SURGERY
Payer: COMMERCIAL

## 2025-05-27 VITALS
DIASTOLIC BLOOD PRESSURE: 57 MMHG | RESPIRATION RATE: 18 BRPM | SYSTOLIC BLOOD PRESSURE: 96 MMHG | HEART RATE: 107 BPM | TEMPERATURE: 97.2 F

## 2025-05-27 DIAGNOSIS — S96.902D OPEN WOUND OF LEFT FOOT WITH TENDON INVOLVEMENT, SUBSEQUENT ENCOUNTER: ICD-10-CM

## 2025-05-27 DIAGNOSIS — L97.522 PLANTAR ULCER OF LEFT FOOT WITH FAT LAYER EXPOSED (HCC): ICD-10-CM

## 2025-05-27 DIAGNOSIS — S91.302A OPEN WOUND OF LEFT FOOT WITH TENDON INVOLVEMENT, INITIAL ENCOUNTER: ICD-10-CM

## 2025-05-27 DIAGNOSIS — R73.03 PREDIABETES: ICD-10-CM

## 2025-05-27 DIAGNOSIS — S91.302D OPEN WOUND OF LEFT FOOT WITH TENDON INVOLVEMENT, SUBSEQUENT ENCOUNTER: ICD-10-CM

## 2025-05-27 DIAGNOSIS — L97.922 CHRONIC ULCER OF LEFT LOWER EXTREMITY WITH FAT LAYER EXPOSED (HCC): ICD-10-CM

## 2025-05-27 DIAGNOSIS — S91.301A OPEN WOUND OF RIGHT FOOT, INITIAL ENCOUNTER: ICD-10-CM

## 2025-05-27 DIAGNOSIS — S91.301D OPEN WOUND OF RIGHT FOOT, SUBSEQUENT ENCOUNTER: ICD-10-CM

## 2025-05-27 DIAGNOSIS — L97.512 PLANTAR ULCER OF RIGHT FOOT WITH FAT LAYER EXPOSED (HCC): ICD-10-CM

## 2025-05-27 DIAGNOSIS — L97.313: ICD-10-CM

## 2025-05-27 DIAGNOSIS — L97.523 CHRONIC FOOT ULCER, LEFT, WITH NECROSIS OF MUSCLE (HCC): ICD-10-CM

## 2025-05-27 DIAGNOSIS — L97.322 CHRONIC ULCER OF LEFT ANKLE WITH FAT LAYER EXPOSED (HCC): ICD-10-CM

## 2025-05-27 DIAGNOSIS — L97.912 CHRONIC ULCER OF RIGHT LOWER EXTREMITY WITH FAT LAYER EXPOSED (HCC): ICD-10-CM

## 2025-05-27 DIAGNOSIS — S96.902A OPEN WOUND OF LEFT FOOT WITH TENDON INVOLVEMENT, INITIAL ENCOUNTER: ICD-10-CM

## 2025-05-27 PROCEDURE — 11045 DBRDMT SUBQ TISS EACH ADDL: CPT

## 2025-05-27 PROCEDURE — 11043 DBRDMT MUSC&/FSCA 1ST 20/<: CPT

## 2025-05-27 PROCEDURE — 11046 DBRDMT MUSC&/FSCA EA ADDL: CPT

## 2025-05-27 PROCEDURE — 11042 DBRDMT SUBQ TIS 1ST 20SQCM/<: CPT

## 2025-05-27 NOTE — PROGRESS NOTES
[facility]   Wound Care and Hyperbaric Oxygen Therapy Center   Medical Staff Note     Jackie Yuan  MEDICAL RECORD NUMBER:  [id number]  AGE: 60 y.o.   GENDER: female  : 1965  EPISODE DATE:  2025    Chief complaint and reason for visit:     Chief Complaint   Patient presents with    Wound Check     Left and right LE wounds       Patient presenting for evaluation of wound(s) per chief complaint.      58 yo F with endometrial cancer and prediabetes presents with multiple lower extremity wounds. She had a reaction to chemotherapy medication Keytruda which resulted in the wounds. Patient reports all wounds started after going on Keytruda.     Wound pain is intermittent and at baseline. No wound erythema.  Minimal wound drainage. She is working from home on Tue and Thur. At work she alternates which leg she is keeping elevated.  Keeping legs elevated has decreased leg swelling and wound discharge.     History of Wound Context: Per original history and physical on this patient. Changes in history since last evaluation: none      Medical Decision Making:     Problem List Items Addressed This Visit          Musculoskeletal and Integument    Open wound of left foot with tendon involvement    Relevant Medications    lidocaine 4 % jelly    Other Relevant Orders    Initiate Outpatient Wound Care Protocol       Other    BMI 60.0-69.9, adult (HCC) - Primary    Relevant Medications    lidocaine 4 % jelly    Other Relevant Orders    Initiate Outpatient Wound Care Protocol    Prediabetes    Relevant Medications    lidocaine 4 % jelly    Other Relevant Orders    Initiate Outpatient Wound Care Protocol    Open wound of right foot    Relevant Medications    lidocaine 4 % jelly    Other Relevant Orders    Initiate Outpatient Wound Care Protocol    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

## 2025-05-27 NOTE — DISCHARGE INSTRUCTIONS
Wound Clinic Physician Orders and Discharge Instructions  [facility]  [address], [address]  [place] [address]  Telephone: [phone]     FAX [phone]    NAME:  Jackie Yuan  YOB: 1965  MEDICAL RECORD NUMBER:  [id number]  DATE:  5/27/2025      Return Appointment:  [] Dressing Supply Provider: Shane  [] Home Healthcare:  [] Facility:   [x] Return Appointment: 1 Week(s)  [] Nurse Visit:  Week(s)    [] Discharge from [facility]:   [] Healed          [] Refer to Provider:           [] Consult    Follow-up Information:  [] Ordered Tests:   [x] Referrals: [facility] (Dr. Blankenship reviewed biopsy results) - 06/2025 & Dr. Garcia (vascular) - Patient to make and keep up with follow-ups  [] Rx:   [] Culture:  [] Wound Vac:  [] Skin Sub:    [] OR:  [] Lymphedema Pumps:  [] Other:     Wound Cleansing:   Do not scrub or use excessive force.  Cleanse wound prior to applying a clean dressing with:  [x] Normal Saline   [] Keep Wound Dry in Shower     [] Wound Cleanser   [x] Cleanse wound with Mild Soap & Water    [] Other:       Topical Treatments:  Do not apply lotions, creams, or ointments to wound bed unless directed.   [] Apply moisturizing lotion to skin surrounding the wound prior to dressing change.  [] Apply antifungal ointment to skin surrounding the wound prior to dressing change.  [] Apply thin film of moisture barrier ointment to skin immediately around wound.  [] Apply Betadine to skin immediately around wound   [] Other:      Dressings:           Wound Location: Right ankle (both) and Left leg  [x] Apply Primary Dressing:       [] MediHoney Gel [] MediHoney Alginate  [] Calcium Alginate with Silver   [] Calcium Alginate without silver    [] Collagen with silver [] Collagen without Silver - 1st   [] Santyl with moist saline gauze     [] Hydrofera Blue (cut to size and moistened with normal saline)  [] Hydrofera Blue Ready (cut to size)

## 2025-05-27 NOTE — WOUND CARE
Wound Clinic Physician Orders and Discharge Instructions  [facility]  [address], [address]  [place] [address]  Telephone: [phone]     FAX [phone]    NAME:  Jackie Yuan  YOB: 1965  MEDICAL RECORD NUMBER:  [id number]  DATE:  5/27/2025      Return Appointment:  [] Dressing Supply Provider: Shane  [] Home Healthcare:  [] Facility:   [x] Return Appointment: 1 Week(s)  [] Nurse Visit:  Week(s)    [] Discharge from [facility]:   [] Healed          [] Refer to Provider:           [] Consult    Follow-up Information:  [] Ordered Tests:   [x] Referrals: [facility] (Dr. Blankenship reviewed biopsy results) - 06/2025 & Dr. Garcia (vascular) - Patient to make and keep up with follow-ups  [] Rx:   [] Culture:  [] Wound Vac:  [] Skin Sub:    [] OR:  [] Lymphedema Pumps:  [] Other:     Wound Cleansing:   Do not scrub or use excessive force.  Cleanse wound prior to applying a clean dressing with:  [x] Normal Saline   [] Keep Wound Dry in Shower     [] Wound Cleanser   [x] Cleanse wound with Mild Soap & Water    [] Other:       Topical Treatments:  Do not apply lotions, creams, or ointments to wound bed unless directed.   [] Apply moisturizing lotion to skin surrounding the wound prior to dressing change.  [] Apply antifungal ointment to skin surrounding the wound prior to dressing change.  [] Apply thin film of moisture barrier ointment to skin immediately around wound.  [] Apply Betadine to skin immediately around wound   [] Other:      Dressings:           Wound Location: Right ankle (both) and Left leg  [x] Apply Primary Dressing:       [] MediHoney Gel [] MediHoney Alginate  [] Calcium Alginate with Silver   [] Calcium Alginate without silver    [] Collagen with silver [] Collagen without Silver - 1st   [] Santyl with moist saline gauze     [] Hydrofera Blue (cut to size and moistened with normal saline)  [] Hydrofera Blue Ready (cut to size)

## 2025-05-30 NOTE — WOUND CARE
05/30/25 - Patient called and notified  that there was an issue in her dressing change frequency and supplies that can be ordered. Called and spoke with [employer] rep, Duane, since this is dealing with the company and insurance. He said he would reach out to the patient and update me.

## 2025-06-03 ENCOUNTER — HOSPITAL ENCOUNTER (OUTPATIENT)
Facility: HOSPITAL | Age: 60
Discharge: HOME | End: 2025-06-03
Attending: SURGERY
Payer: COMMERCIAL

## 2025-06-03 VITALS
SYSTOLIC BLOOD PRESSURE: 124 MMHG | HEART RATE: 106 BPM | RESPIRATION RATE: 18 BRPM | DIASTOLIC BLOOD PRESSURE: 55 MMHG | TEMPERATURE: 97.6 F

## 2025-06-03 DIAGNOSIS — L97.522 PLANTAR ULCER OF LEFT FOOT WITH FAT LAYER EXPOSED (HCC): ICD-10-CM

## 2025-06-03 DIAGNOSIS — L97.912 CHRONIC ULCER OF RIGHT LOWER EXTREMITY WITH FAT LAYER EXPOSED (HCC): ICD-10-CM

## 2025-06-03 DIAGNOSIS — S96.902A OPEN WOUND OF LEFT FOOT WITH TENDON INVOLVEMENT, INITIAL ENCOUNTER: ICD-10-CM

## 2025-06-03 DIAGNOSIS — S91.302A OPEN WOUND OF LEFT FOOT WITH TENDON INVOLVEMENT, INITIAL ENCOUNTER: ICD-10-CM

## 2025-06-03 DIAGNOSIS — R73.03 PREDIABETES: ICD-10-CM

## 2025-06-03 DIAGNOSIS — L97.922 CHRONIC ULCER OF LEFT LOWER EXTREMITY WITH FAT LAYER EXPOSED (HCC): ICD-10-CM

## 2025-06-03 DIAGNOSIS — L97.512 PLANTAR ULCER OF RIGHT FOOT WITH FAT LAYER EXPOSED (HCC): ICD-10-CM

## 2025-06-03 DIAGNOSIS — S91.301A OPEN WOUND OF RIGHT FOOT, INITIAL ENCOUNTER: ICD-10-CM

## 2025-06-03 DIAGNOSIS — L97.523 CHRONIC FOOT ULCER, LEFT, WITH NECROSIS OF MUSCLE (HCC): ICD-10-CM

## 2025-06-03 DIAGNOSIS — L97.322 CHRONIC ULCER OF LEFT ANKLE WITH FAT LAYER EXPOSED (HCC): ICD-10-CM

## 2025-06-03 DIAGNOSIS — L97.313: ICD-10-CM

## 2025-06-03 PROCEDURE — 99213 OFFICE O/P EST LOW 20 MIN: CPT

## 2025-06-03 NOTE — DISCHARGE INSTRUCTIONS
Wound Clinic Physician Orders and Discharge Instructions  [facility]  [address], [address]  [place] [address]  Telephone: [phone]     FAX [phone]    NAME:  Jackie Yuan  YOB: 1965  MEDICAL RECORD NUMBER:  [id number]  DATE:  6/3/2025      Return Appointment:  [] Dressing Supply Provider: Shane  [] Home Healthcare:  [] Facility:   [x] Return Appointment: 1 Week(s)  [] Nurse Visit:  Week(s)    [] Discharge from [facility]:   [] Healed          [] Refer to Provider:           [] Consult    Follow-up Information:  [] Ordered Tests:   [x] Referrals: [facility] (Dr. Blankenship reviewed biopsy results) - 06/2025 & Dr. Garcia (vascular) - Patient to make and keep up with follow-ups  [] Rx:   [] Culture:  [] Wound Vac:  [] Skin Sub:    [] OR:  [] Lymphedema Pumps:  [] Other:     Wound Cleansing:   Do not scrub or use excessive force.  Cleanse wound prior to applying a clean dressing with:  [x] Normal Saline   [] Keep Wound Dry in Shower     [] Wound Cleanser   [x] Cleanse wound with Mild Soap & Water    [] Other:       Topical Treatments:  Do not apply lotions, creams, or ointments to wound bed unless directed.   [] Apply moisturizing lotion to skin surrounding the wound prior to dressing change.  [] Apply antifungal ointment to skin surrounding the wound prior to dressing change.  [] Apply thin film of moisture barrier ointment to skin immediately around wound.  [] Apply Betadine to skin immediately around wound   [] Other:      Dressings:           Wound Location: Right ankle (both)  [x] Apply Primary Dressing:       [] MediHoney Gel [] MediHoney Alginate  [] Calcium Alginate with Silver   [] Calcium Alginate without silver    [] Collagen with silver [x] Collagen without Silver - 1st   [] Santyl with moist saline gauze     [] Hydrofera Blue (cut to size and moistened with normal saline)  [] Hydrofera Blue Ready (cut to size)      [] Normal

## 2025-06-03 NOTE — PROGRESS NOTES
[facility]   Wound Care and Hyperbaric Oxygen Therapy Center   Medical Staff Note     Jackie Yuan  MEDICAL RECORD NUMBER:  [id number]  AGE: 60 y.o.   GENDER: female  : 1965  EPISODE DATE:  6/3/2025    Chief complaint and reason for visit:     Chief Complaint   Patient presents with    Wound Check     Bilateral ankles, left foot, left upper leg      Patient presenting for evaluation of wound(s) per chief complaint.      60 yo F with endometrial cancer and prediabetes presents with multiple lower extremity wounds. She had a reaction to chemotherapy medication Keytruda which resulted in the wounds. Patient reports all wounds started after going on Keytruda.     Wound pain and wound drainage is at baseline. No wound erythema. She is working from home on Tue and Thur. At work she alternates which leg she is keeping elevated.  Keeping legs elevated has decreased leg swelling and wound discharge.     She has had significant issues getting wound care supplies.  We will attempt to get wound care supplies 1 more time and then try to switch to another company.    History of Wound Context: Per original history and physical on this patient. Changes in history since last evaluation: none      Medical Decision Making:     Problem List Items Addressed This Visit          Musculoskeletal and Integument    Open wound of left foot with tendon involvement    Relevant Medications    lidocaine 4 % jelly    Other Relevant Orders    Initiate Outpatient Wound Care Protocol       Other    BMI 60.0-69.9, adult (HCC) - Primary    Relevant Medications    lidocaine 4 % jelly    Other Relevant Orders    Initiate Outpatient Wound Care Protocol    Prediabetes    Relevant Medications    lidocaine 4 % jelly    Other Relevant Orders    Initiate Outpatient Wound Care Protocol    Open wound of right foot    Relevant Medications    lidocaine 4 % jelly    Other Relevant Orders    Initiate Outpatient Wound Care

## 2025-06-03 NOTE — WOUND CARE
Wound Care Supplies      Supply Company:     [employer] [address] [place] [address] f: [phone] f: [phone] p: [phone] [email or web address]      Ordering Center:     [facility]  [address] [place] [address], [address] [place], [place] [address]    Phone: [phone]  Fax: [phone]    Patient Information:      Jackie Yuan  [address] [facility] [id number] [phone]   : 1965  AGE: 60 y.o.     GENDER: female   EPISODE DATE: 6/3/2025    Insurance:      PRIMARY INSURANCE:  [facility]  [place]age: [place]  Effective Date: 2025  Group Number: [unfilled]  Subscriber Number: [id number] - ([facility])    Payer/Plan Subscr  Sex Relation Sub. Ins. ID Effective Group Num   1. [place]T* CHANTELLE YUAN 1955 Male Spouse [id number]                                   [address] [address]       Patient Wound Information:      Problem List Items Addressed This Visit          Musculoskeletal and Integument    Open wound of left foot with tendon involvement    Relevant Medications    lidocaine 4 % jelly    Other Relevant Orders    Initiate Outpatient Wound Care Protocol       Other    BMI 60.0-69.9, adult (HCC) - Primary    Relevant Medications    lidocaine 4 % jelly    Other Relevant Orders    Initiate Outpatient Wound Care Protocol    Prediabetes    Relevant Medications    lidocaine 4 % jelly    Other Relevant Orders    Initiate Outpatient Wound Care Protocol    Open wound of right foot    Relevant Medications    lidocaine 4 % jelly    Other Relevant Orders    Initiate Outpatient Wound Care Protocol    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

## 2025-06-03 NOTE — WOUND CARE
06/03/25 - Spoke with [employer] rep, Duane, to notify him that we had faxed another supply order since patient's orders changed and since she has no more supplies. He said he would be on the look out for it and see what can be sent out. He said that he was still working to hear from her insurance and would contact me as soon as he has an update.

## 2025-06-03 NOTE — WOUND CARE
Wound Clinic Physician Orders and Discharge Instructions  [facility]  [address], [address]  [place] [address]  Telephone: [phone]     FAX [phone]    NAME:  Jackie Yuan  YOB: 1965  MEDICAL RECORD NUMBER:  [id number]  DATE:  6/3/2025      Return Appointment:  [] Dressing Supply Provider: Shane  [] Home Healthcare:  [] Facility:   [x] Return Appointment: 1 Week(s)  [] Nurse Visit:  Week(s)    [] Discharge from [facility]:   [] Healed          [] Refer to Provider:           [] Consult    Follow-up Information:  [] Ordered Tests:   [x] Referrals: [facility] (Dr. Blankenship reviewed biopsy results) - 06/2025 & Dr. Garcia (vascular) - Patient to make and keep up with follow-ups  [] Rx:   [] Culture:  [] Wound Vac:  [] Skin Sub:    [] OR:  [] Lymphedema Pumps:  [] Other:     Wound Cleansing:   Do not scrub or use excessive force.  Cleanse wound prior to applying a clean dressing with:  [x] Normal Saline   [] Keep Wound Dry in Shower     [] Wound Cleanser   [x] Cleanse wound with Mild Soap & Water    [] Other:       Topical Treatments:  Do not apply lotions, creams, or ointments to wound bed unless directed.   [] Apply moisturizing lotion to skin surrounding the wound prior to dressing change.  [] Apply antifungal ointment to skin surrounding the wound prior to dressing change.  [] Apply thin film of moisture barrier ointment to skin immediately around wound.  [] Apply Betadine to skin immediately around wound   [] Other:      Dressings:           Wound Location: Right ankle (both)  [x] Apply Primary Dressing:       [] MediHoney Gel [] MediHoney Alginate  [] Calcium Alginate with Silver   [] Calcium Alginate without silver    [] Collagen with silver [x] Collagen without Silver - 1st   [] Santyl with moist saline gauze     [] Hydrofera Blue (cut to size and moistened with normal saline)  [] Hydrofera Blue Ready (cut to size)      [] Normal

## 2025-06-07 PROBLEM — N95.0 POST-MENOPAUSE BLEEDING: Status: RESOLVED | Noted: 2023-11-14 | Resolved: 2025-06-07

## 2025-06-07 PROBLEM — Z85.42 PERSONAL HISTORY OF MALIGNANT NEOPLASM OF UTERUS: Status: ACTIVE | Noted: 2025-06-07

## 2025-06-07 PROBLEM — M25.552 BILATERAL HIP PAIN: Status: RESOLVED | Noted: 2021-05-05 | Resolved: 2025-06-07

## 2025-06-07 PROBLEM — M25.551 BILATERAL HIP PAIN: Status: RESOLVED | Noted: 2021-05-05 | Resolved: 2025-06-07

## 2025-06-07 PROBLEM — Z86.69 HISTORY OF BELL'S PALSY: Status: ACTIVE | Noted: 2025-06-07

## 2025-06-07 PROBLEM — Z86.69 HISTORY OF BELL'S PALSY: Status: RESOLVED | Noted: 2021-05-05 | Resolved: 2025-06-07

## 2025-06-07 PROBLEM — S91.001A OPEN WOUND OF RIGHT ANKLE: Status: RESOLVED | Noted: 2025-02-26 | Resolved: 2025-06-07

## 2025-06-07 PROBLEM — Z90.710 STATUS POST COMPLETE HYSTERECTOMY: Status: ACTIVE | Noted: 2025-06-07

## 2025-06-07 PROBLEM — S91.002A OPEN WOUND OF LEFT ANKLE: Status: RESOLVED | Noted: 2025-02-26 | Resolved: 2025-06-07

## 2025-06-07 PROBLEM — N95.0 POSTMENOPAUSAL BLEEDING: Status: RESOLVED | Noted: 2023-12-06 | Resolved: 2025-06-07

## 2025-06-10 ENCOUNTER — HOSPITAL ENCOUNTER (OUTPATIENT)
Facility: HOSPITAL | Age: 60
Discharge: HOME | End: 2025-06-10
Attending: SURGERY
Payer: COMMERCIAL

## 2025-06-10 VITALS
SYSTOLIC BLOOD PRESSURE: 109 MMHG | DIASTOLIC BLOOD PRESSURE: 64 MMHG | TEMPERATURE: 97 F | HEART RATE: 96 BPM | RESPIRATION RATE: 18 BRPM

## 2025-06-10 DIAGNOSIS — L97.122 SKIN ULCER OF LEFT THIGH WITH FAT LAYER EXPOSED (HCC): ICD-10-CM

## 2025-06-10 DIAGNOSIS — L97.523 CHRONIC FOOT ULCER, LEFT, WITH NECROSIS OF MUSCLE (HCC): ICD-10-CM

## 2025-06-10 DIAGNOSIS — S91.302D OPEN WOUND OF LEFT FOOT WITH TENDON INVOLVEMENT, SUBSEQUENT ENCOUNTER: Primary | ICD-10-CM

## 2025-06-10 DIAGNOSIS — L97.313: ICD-10-CM

## 2025-06-10 DIAGNOSIS — R73.03 PREDIABETES: ICD-10-CM

## 2025-06-10 DIAGNOSIS — S91.301A OPEN WOUND OF RIGHT FOOT, INITIAL ENCOUNTER: ICD-10-CM

## 2025-06-10 DIAGNOSIS — L97.522 PLANTAR ULCER OF LEFT FOOT WITH FAT LAYER EXPOSED (HCC): ICD-10-CM

## 2025-06-10 DIAGNOSIS — L97.322 CHRONIC ULCER OF LEFT ANKLE WITH FAT LAYER EXPOSED (HCC): ICD-10-CM

## 2025-06-10 DIAGNOSIS — L97.912 CHRONIC ULCER OF RIGHT LOWER EXTREMITY WITH FAT LAYER EXPOSED (HCC): ICD-10-CM

## 2025-06-10 DIAGNOSIS — S96.902D OPEN WOUND OF LEFT FOOT WITH TENDON INVOLVEMENT, SUBSEQUENT ENCOUNTER: Primary | ICD-10-CM

## 2025-06-10 DIAGNOSIS — S91.302A OPEN WOUND OF LEFT FOOT WITH TENDON INVOLVEMENT, INITIAL ENCOUNTER: ICD-10-CM

## 2025-06-10 DIAGNOSIS — L97.512 PLANTAR ULCER OF RIGHT FOOT WITH FAT LAYER EXPOSED (HCC): ICD-10-CM

## 2025-06-10 DIAGNOSIS — S96.902A OPEN WOUND OF LEFT FOOT WITH TENDON INVOLVEMENT, INITIAL ENCOUNTER: ICD-10-CM

## 2025-06-10 DIAGNOSIS — L97.922 CHRONIC ULCER OF LEFT LOWER EXTREMITY WITH FAT LAYER EXPOSED (HCC): ICD-10-CM

## 2025-06-10 PROCEDURE — 11042 DBRDMT SUBQ TIS 1ST 20SQCM/<: CPT

## 2025-06-10 PROCEDURE — 11043 DBRDMT MUSC&/FSCA 1ST 20/<: CPT

## 2025-06-10 NOTE — DISCHARGE INSTRUCTIONS
Wound Clinic Physician Orders and Discharge Instructions  [facility]  [address], [address]  [place] [address]  Telephone: [phone]     FAX [phone]    NAME:  Jackie Yuan  YOB: 1965  MEDICAL RECORD NUMBER:  [id number]  DATE:  6/10/2025      Return Appointment:  [x] Dressing Supply Provider: [facility]   [] Home Healthcare:  [] Facility:   [x] Return Appointment: 1 Week(s)  [] Nurse Visit:  Week(s)    [] Discharge from [facility]:   [] Healed          [] Refer to Provider:           [] Consult    Follow-up Information:  [] Ordered Tests:   [x] Referrals: [facility] (Dr. Blankenship reviewed biopsy results) - 06/2025 & Dr. Garcia (vascular) - Patient to make and keep up with follow-ups  [] Rx:   [] Culture:  [] Wound Vac:  [] Skin Sub:    [] OR:  [] Lymphedema Pumps:  [] Other:     Wound Cleansing:   Do not scrub or use excessive force.  Cleanse wound prior to applying a clean dressing with:  [x] Normal Saline   [] Keep Wound Dry in Shower     [] Wound Cleanser   [x] Cleanse wound with Mild Soap & Water    [] Other:       Topical Treatments:  Do not apply lotions, creams, or ointments to wound bed unless directed.   [] Apply moisturizing lotion to skin surrounding the wound prior to dressing change.  [] Apply antifungal ointment to skin surrounding the wound prior to dressing change.  [] Apply thin film of moisture barrier ointment to skin immediately around wound.  [] Apply Betadine to skin immediately around wound   [] Other:      Dressings:           Wound Location: Right ankle (both)  [x] Apply Primary Dressing:       [] MediHoney Gel [] MediHoney Alginate  [] Calcium Alginate with Silver   [] Calcium Alginate without silver    [] Collagen with silver [x] Collagen without Silver - 1st   [] Santyl with moist saline gauze     [] Hydrofera Blue (cut to size and moistened with normal saline)  [] Hydrofera Blue Ready (cut to size)      [] Normal

## 2025-06-10 NOTE — WOUND CARE
Wound Care Supplies      Supply Company:     Other [facility]       Ordering Center:     [facility]  [facility] [facility], [address] [place] [address]    Phone: [phone]  Fax: [phone]    Patient Information:      Jackie Burnett- Lissy  [address]  [place] [id number] [phone]   : 1965  AGE: 60 y.o.     GENDER: female   EPISODE DATE: 6/10/2025    Insurance:      PRIMARY INSURANCE:  Plan: [facility] FEDERAL  Coverage: [place]  Effective Date: 2025  Group Number: [unfilled]  Subscriber Number: [id number] - ([facility])    Payer/Plan Subscr  Sex Relation Sub. Ins. ID Effective Group Num   1. [place]T* CHANTELLE GUTIÉRREZ 1955 Male Spouse [id number]                                   [address] [address]       Patient Wound Information:      Problem List Items Addressed This Visit          Musculoskeletal and Integument    Open wound of left foot with tendon involvement - Primary    Relevant Medications    lidocaine 4 % jelly    Other Relevant Orders    Initiate Outpatient Wound Care Protocol    Skin ulcer of left thigh with fat layer exposed (HCC)       Other    BMI 60.0-69.9, adult (HCC)    Relevant Medications    lidocaine 4 % jelly    Other Relevant Orders    Initiate Outpatient Wound Care Protocol    Prediabetes    Relevant Medications    lidocaine 4 % jelly    Other Relevant Orders    Initiate Outpatient Wound Care Protocol    Open wound of right foot    Relevant Medications    lidocaine 4 % jelly    Other Relevant Orders    Initiate Outpatient Wound Care Protocol    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

## 2025-06-10 NOTE — WOUND CARE
Wound Clinic Physician Orders and Discharge Instructions  [facility]  [address], [address]  [place] [address]  Telephone: [phone]     FAX [phone]    NAME:  Jackie Yuan  YOB: 1965  MEDICAL RECORD NUMBER:  [id number]  DATE:  6/10/2025      Return Appointment:  [x] Dressing Supply Provider: [facility]   [] Home Healthcare:  [] Facility:   [x] Return Appointment: 1 Week(s)  [] Nurse Visit:  Week(s)    [] Discharge from [facility]:   [] Healed          [] Refer to Provider:           [] Consult    Follow-up Information:  [] Ordered Tests:   [x] Referrals: [facility] (Dr. Blankenship reviewed biopsy results) - 06/2025 & Dr. Garcia (vascular) - Patient to make and keep up with follow-ups  [] Rx:   [] Culture:  [] Wound Vac:  [] Skin Sub:    [] OR:  [] Lymphedema Pumps:  [] Other:     Wound Cleansing:   Do not scrub or use excessive force.  Cleanse wound prior to applying a clean dressing with:  [x] Normal Saline   [] Keep Wound Dry in Shower     [] Wound Cleanser   [x] Cleanse wound with Mild Soap & Water    [] Other:       Topical Treatments:  Do not apply lotions, creams, or ointments to wound bed unless directed.   [] Apply moisturizing lotion to skin surrounding the wound prior to dressing change.  [] Apply antifungal ointment to skin surrounding the wound prior to dressing change.  [] Apply thin film of moisture barrier ointment to skin immediately around wound.  [] Apply Betadine to skin immediately around wound   [] Other:      Dressings:           Wound Location: Right ankle (both)  [x] Apply Primary Dressing:       [] MediHoney Gel [] MediHoney Alginate  [] Calcium Alginate with Silver   [] Calcium Alginate without silver    [] Collagen with silver [x] Collagen without Silver - 1st   [] Santyl with moist saline gauze     [] Hydrofera Blue (cut to size and moistened with normal saline)  [] Hydrofera Blue Ready (cut to size)      [] Normal

## 2025-06-10 NOTE — PROGRESS NOTES
with necrosis of muscle, right (HCC)    Relevant Medications    lidocaine 4 % jelly    Other Relevant Orders    Initiate Outpatient Wound Care Protocol    Chronic foot ulcer, left, with necrosis of muscle (HCC)    Relevant Medications    lidocaine 4 % jelly    Other Relevant Orders    Initiate Outpatient Wound Care Protocol    Plantar ulcer of right foot with fat layer exposed (HCC)    Relevant Medications    lidocaine 4 % jelly    Other Relevant Orders    Initiate Outpatient Wound Care Protocol    Chronic ulcer of left lower extremity with fat layer exposed (HCC)    Relevant Medications    lidocaine 4 % jelly    Other Relevant Orders    Initiate Outpatient Wound Care Protocol    Chronic ulcer of right lower extremity with fat layer exposed (HCC)    Relevant Medications    lidocaine 4 % jelly    Other Relevant Orders    Initiate Outpatient Wound Care Protocol    Plantar ulcer of left foot with fat layer exposed (HCC)    Relevant Medications    lidocaine 4 % jelly    Other Relevant Orders    Initiate Outpatient Wound Care Protocol    Chronic ulcer of left ankle with fat layer exposed (HCC)    Relevant Medications    lidocaine 4 % jelly    Other Relevant Orders    Initiate Outpatient Wound Care Protocol             Wounds and Treatment Plan:  Right lateral ankle ulcer: Chronic.  Medication side effect.  Measuring smaller    Left dorsal foot ulcer: : Chronic. Medication side effect. Measuring smaller    Right medial ankle ulcer: : Chronic. Medication side effect. Measuring smaller    Left lateral ankle ulcer: Chronic. Medication side effect. Measuring smaller    Right upper leg ulcer: Med side effect. Measuring smaller    Left medial ankle ulcer: HEALED.     Left plantar foot ulcer: HEALED.   Right plantar foot ulcer: HEALED.  Left upper leg ulcers: HEALED       Debridement left dorsal foot and right medial ankle ulcers  Dressing thigh: Silver alginate  Dressing all other wounds: Collagen to large wounds only.

## 2025-06-12 ENCOUNTER — OFFICE VISIT (OUTPATIENT)
Facility: CLINIC | Age: 60
End: 2025-06-12
Payer: COMMERCIAL

## 2025-06-12 VITALS
TEMPERATURE: 98.1 F | OXYGEN SATURATION: 95 % | HEART RATE: 84 BPM | RESPIRATION RATE: 18 BRPM | DIASTOLIC BLOOD PRESSURE: 80 MMHG | BODY MASS INDEX: 50.02 KG/M2 | HEIGHT: 64 IN | SYSTOLIC BLOOD PRESSURE: 122 MMHG | WEIGHT: 293 LBS

## 2025-06-12 DIAGNOSIS — R73.03 PREDIABETES: ICD-10-CM

## 2025-06-12 DIAGNOSIS — G62.0 NEUROPATHY DUE TO CHEMOTHERAPEUTIC DRUG: ICD-10-CM

## 2025-06-12 DIAGNOSIS — Z85.42 PERSONAL HISTORY OF MALIGNANT NEOPLASM OF UTERUS: ICD-10-CM

## 2025-06-12 DIAGNOSIS — Z86.69 HISTORY OF BELL'S PALSY: ICD-10-CM

## 2025-06-12 DIAGNOSIS — I10 ESSENTIAL HYPERTENSION: ICD-10-CM

## 2025-06-12 DIAGNOSIS — Z90.710 STATUS POST COMPLETE HYSTERECTOMY: ICD-10-CM

## 2025-06-12 DIAGNOSIS — M25.511 CHRONIC PAIN OF BOTH SHOULDERS: ICD-10-CM

## 2025-06-12 DIAGNOSIS — T14.90XD HEALING WOUND: ICD-10-CM

## 2025-06-12 DIAGNOSIS — E87.6 HYPOKALEMIA: ICD-10-CM

## 2025-06-12 DIAGNOSIS — M25.552 BILATERAL HIP PAIN: ICD-10-CM

## 2025-06-12 DIAGNOSIS — N18.31 STAGE 3A CHRONIC KIDNEY DISEASE (HCC): ICD-10-CM

## 2025-06-12 DIAGNOSIS — T45.1X5A NEUROPATHY DUE TO CHEMOTHERAPEUTIC DRUG: ICD-10-CM

## 2025-06-12 DIAGNOSIS — M15.0 PRIMARY OSTEOARTHRITIS INVOLVING MULTIPLE JOINTS: ICD-10-CM

## 2025-06-12 DIAGNOSIS — J45.20 MILD INTERMITTENT ASTHMA WITHOUT COMPLICATION: ICD-10-CM

## 2025-06-12 DIAGNOSIS — M25.551 BILATERAL HIP PAIN: ICD-10-CM

## 2025-06-12 DIAGNOSIS — G89.29 CHRONIC PAIN OF BOTH SHOULDERS: ICD-10-CM

## 2025-06-12 DIAGNOSIS — M25.512 CHRONIC PAIN OF BOTH SHOULDERS: ICD-10-CM

## 2025-06-12 DIAGNOSIS — E78.2 MIXED HYPERLIPIDEMIA: ICD-10-CM

## 2025-06-12 LAB
GLUCOSE, POC: 114 MG/DL
HBA1C MFR BLD: 5.7 %

## 2025-06-12 PROCEDURE — 83036 HEMOGLOBIN GLYCOSYLATED A1C: CPT | Performed by: INTERNAL MEDICINE

## 2025-06-12 PROCEDURE — 3074F SYST BP LT 130 MM HG: CPT | Performed by: INTERNAL MEDICINE

## 2025-06-12 PROCEDURE — 99214 OFFICE O/P EST MOD 30 MIN: CPT | Performed by: INTERNAL MEDICINE

## 2025-06-12 PROCEDURE — 3079F DIAST BP 80-89 MM HG: CPT | Performed by: INTERNAL MEDICINE

## 2025-06-12 PROCEDURE — 82962 GLUCOSE BLOOD TEST: CPT | Performed by: INTERNAL MEDICINE

## 2025-06-12 RX ORDER — LOSARTAN POTASSIUM AND HYDROCHLOROTHIAZIDE 12.5; 5 MG/1; MG/1
1 TABLET ORAL EVERY MORNING
Qty: 90 TABLET | Refills: 1 | Status: SHIPPED | OUTPATIENT
Start: 2025-06-12

## 2025-06-12 RX ORDER — CETIRIZINE HYDROCHLORIDE 10 MG/1
10 TABLET ORAL DAILY
Qty: 90 TABLET | Refills: 1 | Status: SHIPPED | OUTPATIENT
Start: 2025-06-12

## 2025-06-12 RX ORDER — ALBUTEROL SULFATE 90 UG/1
2 INHALANT RESPIRATORY (INHALATION) EVERY 6 HOURS PRN
Qty: 18 G | Refills: 5 | Status: SHIPPED | OUTPATIENT
Start: 2025-06-12

## 2025-06-12 RX ORDER — GABAPENTIN 300 MG/1
300 CAPSULE ORAL 2 TIMES DAILY
Qty: 180 CAPSULE | Refills: 1 | Status: CANCELLED | OUTPATIENT
Start: 2025-06-12 | End: 2025-12-09

## 2025-06-12 RX ORDER — AMLODIPINE BESYLATE 5 MG/1
5 TABLET ORAL DAILY
Qty: 90 TABLET | Refills: 1 | Status: SHIPPED | OUTPATIENT
Start: 2025-06-12

## 2025-06-12 RX ORDER — DULOXETIN HYDROCHLORIDE 30 MG/1
30 CAPSULE, DELAYED RELEASE ORAL DAILY
Qty: 90 CAPSULE | Refills: 1 | Status: SHIPPED | OUTPATIENT
Start: 2025-06-12

## 2025-06-12 RX ORDER — POTASSIUM CHLORIDE 750 MG/1
10 TABLET, EXTENDED RELEASE ORAL 2 TIMES DAILY
Qty: 90 TABLET | Refills: 0 | Status: SHIPPED | OUTPATIENT
Start: 2025-06-12

## 2025-06-12 ASSESSMENT — ENCOUNTER SYMPTOMS
RESPIRATORY NEGATIVE: 1
ALLERGIC/IMMUNOLOGIC NEGATIVE: 1
GASTROINTESTINAL NEGATIVE: 1

## 2025-06-12 NOTE — PROGRESS NOTES
Chief Complaint   Patient presents with    Follow-up Chronic Condition       /80 (BP Site: Left Upper Arm, Patient Position: Sitting)   Pulse 94   Temp 98.1 °F (36.7 °C) (Oral)   Resp 18   Ht 1.626 m (5' 4\")   Wt (!) 146.1 kg (322 lb)   SpO2 95%   BMI 55.27 kg/m²       Have you been to the ER, urgent care clinic since your last visit?  Hospitalized since your last visit?   NO    Have you seen or consulted any other health care providers outside our system since your last visit?   NO             
supply: 90 days Max Daily Amount: 600 mg (Patient taking differently: Take 1 capsule by mouth daily. Once a day) 180 capsule 1    Apple Juan Vn-Grn Tea-Bit Or-Cr (APPLE CIDER VINEGAR PLUS PO) Take 1 tablet by mouth daily      Cholecalciferol (VITAMIN D3) 125 MCG (5000 UT) TABS Take 1 tablet by mouth daily      latanoprost (XALATAN) 0.005 % ophthalmic solution       acetaminophen (TYLENOL) 650 MG extended release tablet Take 1 tablet by mouth 2 times daily as needed      azelastine (ASTELIN) 0.1 % nasal spray 2 sprays by Nasal route daily as needed       No current facility-administered medications for this visit.      Past Medical History:   Diagnosis Date    Arthritis     Asthma     Chronic pain     High triglycerides 2022    Hypertension     Ulcer of right lower extremity, limited to breakdown of skin (HCC) 3/24/2022     Past Surgical History:   Procedure Laterality Date     SECTION       SECTION  1989    COLONOSCOPY      DILATION AND CURETTAGE OF UTERUS N/A 2023    DILATATION AND CURETTAGE HYSTEROSCOPY, ENDOMETRIAL EVALUATION performed by Anne Felix MD at [facility] MAIN OR     Family History   Problem Relation Age of Onset    Hypertension Mother     Cancer Mother     Cancer Father     Diabetes Mother      Social History     Tobacco Use   Smoking Status Never   Smokeless Tobacco Never     Review of Systems   Constitutional: Negative.    HENT: Negative.     Respiratory: Negative.     Cardiovascular: Negative.    Gastrointestinal: Negative.    Endocrine: Negative.    Genitourinary: Negative.    Musculoskeletal: Negative.         Bilateral hip pain and shoulder pain send restricted range of movement and using wheelchair for transport and ambulation   Skin: Negative.         She is having dressing and having gauze covered with dressing for wounds on both feet and both inner part of the thigh and lower legs according to her it is healing I saw the pictures.  I could not open

## 2025-06-17 ENCOUNTER — HOSPITAL ENCOUNTER (OUTPATIENT)
Facility: HOSPITAL | Age: 60
Discharge: HOME | End: 2025-06-17
Attending: SURGERY
Payer: COMMERCIAL

## 2025-06-17 VITALS
SYSTOLIC BLOOD PRESSURE: 179 MMHG | RESPIRATION RATE: 18 BRPM | HEART RATE: 71 BPM | DIASTOLIC BLOOD PRESSURE: 55 MMHG | TEMPERATURE: 98.2 F

## 2025-06-17 DIAGNOSIS — S91.301A OPEN WOUND OF RIGHT FOOT, INITIAL ENCOUNTER: ICD-10-CM

## 2025-06-17 DIAGNOSIS — L97.512 PLANTAR ULCER OF RIGHT FOOT WITH FAT LAYER EXPOSED (HCC): ICD-10-CM

## 2025-06-17 DIAGNOSIS — S96.902A OPEN WOUND OF LEFT FOOT WITH TENDON INVOLVEMENT, INITIAL ENCOUNTER: ICD-10-CM

## 2025-06-17 DIAGNOSIS — L97.922 CHRONIC ULCER OF LEFT LOWER EXTREMITY WITH FAT LAYER EXPOSED (HCC): ICD-10-CM

## 2025-06-17 DIAGNOSIS — S91.302D OPEN WOUND OF LEFT FOOT WITH TENDON INVOLVEMENT, SUBSEQUENT ENCOUNTER: ICD-10-CM

## 2025-06-17 DIAGNOSIS — S91.301D OPEN WOUND OF RIGHT FOOT, SUBSEQUENT ENCOUNTER: ICD-10-CM

## 2025-06-17 DIAGNOSIS — R73.03 PREDIABETES: ICD-10-CM

## 2025-06-17 DIAGNOSIS — S91.302A OPEN WOUND OF LEFT FOOT WITH TENDON INVOLVEMENT, INITIAL ENCOUNTER: ICD-10-CM

## 2025-06-17 DIAGNOSIS — L97.912 CHRONIC ULCER OF RIGHT LOWER EXTREMITY WITH FAT LAYER EXPOSED (HCC): ICD-10-CM

## 2025-06-17 DIAGNOSIS — L97.523 CHRONIC FOOT ULCER, LEFT, WITH NECROSIS OF MUSCLE (HCC): ICD-10-CM

## 2025-06-17 DIAGNOSIS — L97.322 CHRONIC ULCER OF LEFT ANKLE WITH FAT LAYER EXPOSED (HCC): ICD-10-CM

## 2025-06-17 DIAGNOSIS — L97.522 PLANTAR ULCER OF LEFT FOOT WITH FAT LAYER EXPOSED (HCC): ICD-10-CM

## 2025-06-17 DIAGNOSIS — L97.313: ICD-10-CM

## 2025-06-17 DIAGNOSIS — S96.902D OPEN WOUND OF LEFT FOOT WITH TENDON INVOLVEMENT, SUBSEQUENT ENCOUNTER: ICD-10-CM

## 2025-06-17 PROCEDURE — 11043 DBRDMT MUSC&/FSCA 1ST 20/<: CPT

## 2025-06-17 PROCEDURE — 87070 CULTURE OTHR SPECIMN AEROBIC: CPT

## 2025-06-17 PROCEDURE — 87186 SC STD MICRODIL/AGAR DIL: CPT

## 2025-06-17 PROCEDURE — 11042 DBRDMT SUBQ TIS 1ST 20SQCM/<: CPT

## 2025-06-17 PROCEDURE — 11046 DBRDMT MUSC&/FSCA EA ADDL: CPT

## 2025-06-17 PROCEDURE — 87205 SMEAR GRAM STAIN: CPT

## 2025-06-17 NOTE — PROGRESS NOTES
[facility]   Wound Care and [facility] Therapy Center   Medical Staff Note     Jackie Yuan  MEDICAL RECORD NUMBER:  [id number]  AGE: 60 y.o.   GENDER: female  : 1965  EPISODE DATE:  2025    Chief complaint and reason for visit:     Chief Complaint   Patient presents with    Wound Check     Left thigh,left lateral ankle, right medial ankle, left dorsal foot, right lateral ankle wound       Patient presenting for evaluation of wound(s) per chief complaint.      60 yo F with endometrial cancer and prediabetes presents with multiple lower extremity wounds. She had a reaction to chemotherapy medication Keytruda which resulted in the wounds. Patient reports all wounds started after going on Keytruda.     Complains of pain from right medial ankle wound that is new.   Otherwise no wound pain from other wounds. Wound drainage is increased from right medial ankle, otherwise at baseline. No wound erythema. She is working from home on Tue and Thur. At work she alternates which leg she is keeping elevated.  Keeping legs elevated has decreased leg swelling and wound discharge.     She is out of supplies. [employer] wound [employer] supply company sent supplies, based on tracking should come today.     History of Wound Context: Per original history and physical on this patient. Changes in history since last evaluation: none      Medical Decision Making:     Problem List Items Addressed This Visit          Musculoskeletal and Integument    Open wound of left foot with tendon involvement    Relevant Medications    lidocaine 4 % jelly    Other Relevant Orders    Initiate Outpatient Wound Care Protocol       Other    BMI 60.0-69.9, adult (HCC) - Primary    Relevant Medications    lidocaine 4 % jelly    Other Relevant Orders    Initiate Outpatient Wound Care Protocol    Prediabetes    Relevant Medications    lidocaine 4 % jelly    Other Relevant Orders    Initiate Outpatient Wound Care Protocol

## 2025-06-17 NOTE — DISCHARGE INSTRUCTIONS
Wound Clinic Physician Orders and Discharge Instructions  [facility]  [address], [address]  [place] [address]  Telephone: [phone]     FAX [phone]    NAME:  Jackie Yuan  YOB: 1965  MEDICAL RECORD NUMBER:  [id number]  DATE:  6/17/2025      Return Appointment:  [] Dressing Supply Provider: [facility]   [] Home Healthcare:  [] Facility:   [x] Return Appointment: 1 Week(s)  [] Nurse Visit:  Week(s)    [] Discharge from [facility]:   [] Healed          [] Refer to Provider:           [] Consult    Follow-up Information:  [] Ordered Tests:   [x] Referrals: [facility] (Dr. Blankenship reviewed biopsy results) - 06/2025 & Dr. Garcia (vascular) - Patient to make and keep up with follow-ups  [] Rx:   [x] Culture: Swab culture obtained of Right medial ankle in clinic  [] Wound Vac:  [] Skin Sub:    [] OR:  [] Lymphedema Pumps:  [] Other:     Wound Cleansing:   Do not scrub or use excessive force.  Cleanse wound prior to applying a clean dressing with:  [x] Normal Saline   [] Keep Wound Dry in Shower     [] Wound Cleanser   [x] Cleanse wound with Mild Soap & Water    [] Other:       Topical Treatments:  Do not apply lotions, creams, or ointments to wound bed unless directed.   [] Apply moisturizing lotion to skin surrounding the wound prior to dressing change.  [] Apply antifungal ointment to skin surrounding the wound prior to dressing change.  [] Apply thin film of moisture barrier ointment to skin immediately around wound.  [] Apply Betadine to skin immediately around wound   [] Other:      Dressings:           Wound Location: Right ankle (both)  [x] Apply Primary Dressing:       [] MediHoney Gel [] MediHoney Alginate  [] Calcium Alginate with Silver   [] Calcium Alginate without silver    [] Collagen with silver [x] Collagen without Silver - 1st   [] Santyl with moist saline gauze     [] Hydrofera Blue (cut to size and moistened with normal saline)

## 2025-06-17 NOTE — WOUND CARE
Wound Clinic Physician Orders and Discharge Instructions  [facility]  [address], [address]  [place] [address]  Telephone: [phone]     FAX [phone]    NAME:  Jackie Yuan  YOB: 1965  MEDICAL RECORD NUMBER:  [id number]  DATE:  6/17/2025      Return Appointment:  [] Dressing Supply Provider: [facility]   [] Home Healthcare:  [] Facility:   [x] Return Appointment: 1 Week(s)  [] Nurse Visit:  Week(s)    [] Discharge from [facility]:   [] Healed          [] Refer to Provider:           [] Consult    Follow-up Information:  [] Ordered Tests:   [x] Referrals: [facility] (Dr. Blankenship reviewed biopsy results) - 06/2025 & Dr. Garcia (vascular) - Patient to make and keep up with follow-ups  [] Rx:   [x] Culture: Swab culture obtained of Right medial ankle in clinic  [] Wound Vac:  [] Skin Sub:    [] OR:  [] Lymphedema Pumps:  [] Other:     Wound Cleansing:   Do not scrub or use excessive force.  Cleanse wound prior to applying a clean dressing with:  [x] Normal Saline   [] Keep Wound Dry in Shower     [] Wound Cleanser   [x] Cleanse wound with Mild Soap & Water    [] Other:       Topical Treatments:  Do not apply lotions, creams, or ointments to wound bed unless directed.   [] Apply moisturizing lotion to skin surrounding the wound prior to dressing change.  [] Apply antifungal ointment to skin surrounding the wound prior to dressing change.  [] Apply thin film of moisture barrier ointment to skin immediately around wound.  [] Apply Betadine to skin immediately around wound   [] Other:      Dressings:           Wound Location: Right ankle (both)  [x] Apply Primary Dressing:       [] MediHoney Gel [] MediHoney Alginate  [] Calcium Alginate with Silver   [] Calcium Alginate without silver    [] Collagen with silver [x] Collagen without Silver - 1st   [] Santyl with moist saline gauze     [] Hydrofera Blue (cut to size and moistened with normal saline)

## 2025-06-20 ENCOUNTER — RESULTS FOLLOW-UP (OUTPATIENT)
Facility: HOSPITAL | Age: 60
End: 2025-06-20

## 2025-06-20 DIAGNOSIS — T14.8XXA WOUND INFECTION: Primary | ICD-10-CM

## 2025-06-20 DIAGNOSIS — L08.9 WOUND INFECTION: Primary | ICD-10-CM

## 2025-06-20 RX ORDER — LEVOFLOXACIN 750 MG/1
750 TABLET, FILM COATED ORAL DAILY
Qty: 10 TABLET | Refills: 0 | Status: SHIPPED | OUTPATIENT
Start: 2025-06-20 | End: 2025-06-30

## 2025-06-20 NOTE — TELEPHONE ENCOUNTER
Called to let patient know about wound culture results. Start Levaquin.     Shaila Blankenship MD

## 2025-06-20 NOTE — WOUND CARE
06/20/25 - Patient notified  that she received all of her wound care supplies. No further questions as this time.

## 2025-06-22 LAB
BACTERIA SPEC CULT: ABNORMAL
GRAM STN SPEC: ABNORMAL
GRAM STN SPEC: ABNORMAL
Lab: ABNORMAL

## 2025-06-24 ENCOUNTER — HOSPITAL ENCOUNTER (OUTPATIENT)
Facility: HOSPITAL | Age: 60
Discharge: HOME | End: 2025-06-24
Attending: SURGERY
Payer: COMMERCIAL

## 2025-06-24 VITALS
DIASTOLIC BLOOD PRESSURE: 63 MMHG | TEMPERATURE: 97.3 F | RESPIRATION RATE: 18 BRPM | HEART RATE: 85 BPM | SYSTOLIC BLOOD PRESSURE: 104 MMHG

## 2025-06-24 DIAGNOSIS — S91.301A OPEN WOUND OF RIGHT FOOT, INITIAL ENCOUNTER: ICD-10-CM

## 2025-06-24 DIAGNOSIS — S91.301D OPEN WOUND OF RIGHT FOOT, SUBSEQUENT ENCOUNTER: ICD-10-CM

## 2025-06-24 DIAGNOSIS — L97.512 PLANTAR ULCER OF RIGHT FOOT WITH FAT LAYER EXPOSED (HCC): ICD-10-CM

## 2025-06-24 DIAGNOSIS — S91.302A OPEN WOUND OF LEFT FOOT WITH TENDON INVOLVEMENT, INITIAL ENCOUNTER: ICD-10-CM

## 2025-06-24 DIAGNOSIS — L97.522 PLANTAR ULCER OF LEFT FOOT WITH FAT LAYER EXPOSED (HCC): ICD-10-CM

## 2025-06-24 DIAGNOSIS — L97.523 CHRONIC FOOT ULCER, LEFT, WITH NECROSIS OF MUSCLE (HCC): ICD-10-CM

## 2025-06-24 DIAGNOSIS — L97.322 CHRONIC ULCER OF LEFT ANKLE WITH FAT LAYER EXPOSED (HCC): ICD-10-CM

## 2025-06-24 DIAGNOSIS — L97.912 CHRONIC ULCER OF RIGHT LOWER EXTREMITY WITH FAT LAYER EXPOSED (HCC): ICD-10-CM

## 2025-06-24 DIAGNOSIS — L97.313: ICD-10-CM

## 2025-06-24 DIAGNOSIS — S96.902A OPEN WOUND OF LEFT FOOT WITH TENDON INVOLVEMENT, INITIAL ENCOUNTER: ICD-10-CM

## 2025-06-24 DIAGNOSIS — R73.03 PREDIABETES: ICD-10-CM

## 2025-06-24 DIAGNOSIS — S96.902D OPEN WOUND OF LEFT FOOT WITH TENDON INVOLVEMENT, SUBSEQUENT ENCOUNTER: ICD-10-CM

## 2025-06-24 DIAGNOSIS — S91.302D OPEN WOUND OF LEFT FOOT WITH TENDON INVOLVEMENT, SUBSEQUENT ENCOUNTER: ICD-10-CM

## 2025-06-24 DIAGNOSIS — L97.922 CHRONIC ULCER OF LEFT LOWER EXTREMITY WITH FAT LAYER EXPOSED (HCC): ICD-10-CM

## 2025-06-24 PROCEDURE — 17250 CHEM CAUT OF GRANLTJ TISSUE: CPT

## 2025-06-24 PROCEDURE — 11043 DBRDMT MUSC&/FSCA 1ST 20/<: CPT

## 2025-06-24 PROCEDURE — 11046 DBRDMT MUSC&/FSCA EA ADDL: CPT

## 2025-06-24 NOTE — WOUND CARE
Wound Clinic Physician Orders and Discharge Instructions  [facility]  [address], [address]  [place] [address]  Telephone: [phone]     FAX [phone]    NAME:  Jackie Yuan  YOB: 1965  MEDICAL RECORD NUMBER:  [id number]  DATE:  6/24/2025      Return Appointment:  [] Dressing Supply Provider: [facility]   [] Home Healthcare:  [] Facility:   [x] Return Appointment: 1 Week(s)  [] Nurse Visit:  Week(s)    [] Discharge from [facility]:   [] Healed          [] Refer to Provider:           [] Consult    Follow-up Information:  [] Ordered Tests:   [x] Referrals: [facility] (Dr. Blankenship reviewed biopsy results) - 06/2025 & Dr. Garcia (vascular) - Patient to make and keep up with follow-ups  [x] Rx: Continue Levaquin  [] Culture:   [] Wound Vac:  [] Skin Sub:    [] OR:  [] Lymphedema Pumps:  [] Other:     Wound Cleansing:   Do not scrub or use excessive force.  Cleanse wound prior to applying a clean dressing with:  [x] Normal Saline   [] Keep Wound Dry in Shower     [] Wound Cleanser   [x] Cleanse wound with Mild Soap & Water    [] Other:       Topical Treatments:  Do not apply lotions, creams, or ointments to wound bed unless directed.   [] Apply moisturizing lotion to skin surrounding the wound prior to dressing change.  [] Apply antifungal ointment to skin surrounding the wound prior to dressing change.  [] Apply thin film of moisture barrier ointment to skin immediately around wound.  [] Apply Betadine to skin immediately around wound   [] Other:      Dressings:           Wound Location: Right ankle (Lateral and Medial )  [x] Apply Primary Dressing:       [] MediHoney Gel [] MediHoney Alginate  [] Calcium Alginate with Silver   [] Calcium Alginate without silver    [] Collagen with silver [x] Collagen without Silver - 1st   [] Santyl with moist saline gauze     [] Hydrofera Blue (cut to size and moistened with normal saline)  [] Hydrofera Blue

## 2025-06-24 NOTE — DISCHARGE INSTRUCTIONS
Wound Clinic Physician Orders and Discharge Instructions  [facility]  [address], [address]  [place] [address]  Telephone: [phone]     FAX [phone]    NAME:  Jackie Yuan  YOB: 1965  MEDICAL RECORD NUMBER:  [id number]  DATE:  6/24/2025      Return Appointment:  [] Dressing Supply Provider: [facility]   [] Home Healthcare:  [] Facility:   [x] Return Appointment: 1 Week(s)  [] Nurse Visit:  Week(s)    [] Discharge from [facility]:   [] Healed          [] Refer to Provider:           [] Consult    Follow-up Information:  [] Ordered Tests:   [x] Referrals: [facility] (Dr. Blankenship reviewed biopsy results) - 06/2025 & Dr. Garcia (vascular) - Patient to make and keep up with follow-ups  [x] Rx: Continue Levaquin  [] Culture:   [] Wound Vac:  [] Skin Sub:    [] OR:  [] Lymphedema Pumps:  [] Other:     Wound Cleansing:   Do not scrub or use excessive force.  Cleanse wound prior to applying a clean dressing with:  [x] Normal Saline   [] Keep Wound Dry in Shower     [] Wound Cleanser   [x] Cleanse wound with Mild Soap & Water    [] Other:       Topical Treatments:  Do not apply lotions, creams, or ointments to wound bed unless directed.   [] Apply moisturizing lotion to skin surrounding the wound prior to dressing change.  [] Apply antifungal ointment to skin surrounding the wound prior to dressing change.  [] Apply thin film of moisture barrier ointment to skin immediately around wound.  [] Apply Betadine to skin immediately around wound   [] Other:      Dressings:           Wound Location: Right ankle (Lateral and Medial )  [x] Apply Primary Dressing:       [] MediHoney Gel [] MediHoney Alginate  [] Calcium Alginate with Silver   [] Calcium Alginate without silver    [] Collagen with silver [x] Collagen without Silver - 1st   [] Santyl with moist saline gauze     [] Hydrofera Blue (cut to size and moistened with normal saline)  [] Hydrofera Blue

## 2025-06-24 NOTE — WOUND CARE
06/24/25 - Culture results were reviewed already by Dr. Blankenship and [facility]

## 2025-06-24 NOTE — PROGRESS NOTES
Take 1 tablet by mouth daily for 10 days 10 tablet 0    albuterol sulfate HFA (PROVENTIL;VENTOLIN;PROAIR) 108 (90 Base) MCG/ACT inhaler Inhale 2 puffs into the lungs every 6 hours as needed for Wheezing or Shortness of Breath 18 g 5    amLODIPine (NORVASC) 5 MG tablet Take 1 tablet by mouth daily 90 tablet 1    cetirizine (ZYRTEC) 10 MG tablet Take 1 tablet by mouth daily 90 tablet 1    losartan-hydroCHLOROthiazide (HYZAAR) 50-12.5 MG per tablet Take 1 tablet by mouth every morning 90 tablet 1    potassium chloride (KLOR-CON M) 10 MEQ extended release tablet Take 1 tablet by mouth 2 times daily 90 tablet 0    DULoxetine (CYMBALTA) 30 MG extended release capsule Take 1 capsule by mouth daily 90 capsule 1    metroNIDAZOLE (FLAGYL) 500 MG tablet Take 1 tablet by mouth 2 times daily      triamcinolone (KENALOG) 0.1 % cream Apply topically 2 times daily      betamethasone dipropionate 0.05 % cream Apply topically 2 times daily. 45 g 1    magnesium oxide (MAG-OX) 400 (240 Mg) MG tablet Take 2 tablets by mouth daily      Apple Juan Vn-Grn Tea-Bit Or-Cr (APPLE CIDER VINEGAR PLUS PO) Take 1 tablet by mouth daily      Cholecalciferol (VITAMIN D3) 125 MCG (5000 UT) TABS Take 1 tablet by mouth daily      latanoprost (XALATAN) 0.005 % ophthalmic solution       acetaminophen (TYLENOL) 650 MG extended release tablet Take 1 tablet by mouth 2 times daily as needed      azelastine (ASTELIN) 0.1 % nasal spray 2 sprays by Nasal route daily as needed       No current facility-administered medications on file prior to encounter.         Written patient dismissal instructions given to patient and signed by patient or POA.         Electronically signed by Shaila Blankenship MD on 6/24/2025 at 2:54 PM

## 2025-07-07 ENCOUNTER — HOSPITAL ENCOUNTER (EMERGENCY)
Facility: HOSPITAL | Age: 60
Discharge: HOME | End: 2025-07-08
Attending: EMERGENCY MEDICINE
Payer: COMMERCIAL

## 2025-07-07 DIAGNOSIS — K85.90 ACUTE PANCREATITIS, UNSPECIFIED COMPLICATION STATUS, UNSPECIFIED PANCREATITIS TYPE: ICD-10-CM

## 2025-07-07 DIAGNOSIS — R10.13 ABDOMINAL PAIN, EPIGASTRIC: Primary | ICD-10-CM

## 2025-07-07 DIAGNOSIS — R11.2 NAUSEA AND VOMITING, UNSPECIFIED VOMITING TYPE: ICD-10-CM

## 2025-07-07 DIAGNOSIS — K80.20 GALLSTONES: ICD-10-CM

## 2025-07-07 PROCEDURE — 93005 ELECTROCARDIOGRAM TRACING: CPT | Performed by: EMERGENCY MEDICINE

## 2025-07-07 PROCEDURE — 6370000000 HC RX 637 (ALT 250 FOR IP): Performed by: EMERGENCY MEDICINE

## 2025-07-07 PROCEDURE — 99285 EMERGENCY DEPT VISIT HI MDM: CPT

## 2025-07-07 RX ORDER — ONDANSETRON 2 MG/ML
4 INJECTION INTRAMUSCULAR; INTRAVENOUS ONCE
Status: COMPLETED | OUTPATIENT
Start: 2025-07-07 | End: 2025-07-08

## 2025-07-07 RX ADMIN — LIDOCAINE HYDROCHLORIDE 40 ML: 20 SOLUTION ORAL; TOPICAL at 23:47

## 2025-07-07 ASSESSMENT — LIFESTYLE VARIABLES
HOW OFTEN DO YOU HAVE A DRINK CONTAINING ALCOHOL: NEVER
HOW MANY STANDARD DRINKS CONTAINING ALCOHOL DO YOU HAVE ON A TYPICAL DAY: PATIENT DOES NOT DRINK

## 2025-07-07 ASSESSMENT — PAIN - FUNCTIONAL ASSESSMENT: PAIN_FUNCTIONAL_ASSESSMENT: 0-10

## 2025-07-07 ASSESSMENT — PAIN DESCRIPTION - LOCATION: LOCATION: ABDOMEN

## 2025-07-07 ASSESSMENT — PAIN DESCRIPTION - ORIENTATION: ORIENTATION: UPPER

## 2025-07-08 ENCOUNTER — APPOINTMENT (OUTPATIENT)
Facility: HOSPITAL | Age: 60
End: 2025-07-08
Payer: COMMERCIAL

## 2025-07-08 ENCOUNTER — HOSPITAL ENCOUNTER (OUTPATIENT)
Facility: HOSPITAL | Age: 60
Discharge: HOME | End: 2025-07-08
Attending: SURGERY
Payer: COMMERCIAL

## 2025-07-08 ENCOUNTER — ANCILLARY PROCEDURE (OUTPATIENT)
Facility: HOSPITAL | Age: 60
End: 2025-07-08
Attending: EMERGENCY MEDICINE
Payer: COMMERCIAL

## 2025-07-08 VITALS
SYSTOLIC BLOOD PRESSURE: 127 MMHG | HEIGHT: 66 IN | HEART RATE: 79 BPM | TEMPERATURE: 97.7 F | WEIGHT: 293 LBS | RESPIRATION RATE: 20 BRPM | DIASTOLIC BLOOD PRESSURE: 61 MMHG | BODY MASS INDEX: 47.09 KG/M2 | OXYGEN SATURATION: 98 %

## 2025-07-08 VITALS
SYSTOLIC BLOOD PRESSURE: 107 MMHG | TEMPERATURE: 97.9 F | DIASTOLIC BLOOD PRESSURE: 60 MMHG | HEART RATE: 93 BPM | RESPIRATION RATE: 18 BRPM

## 2025-07-08 DIAGNOSIS — S91.302A OPEN WOUND OF LEFT FOOT WITH TENDON INVOLVEMENT, INITIAL ENCOUNTER: ICD-10-CM

## 2025-07-08 DIAGNOSIS — L97.313: ICD-10-CM

## 2025-07-08 DIAGNOSIS — S96.902A OPEN WOUND OF LEFT FOOT WITH TENDON INVOLVEMENT, INITIAL ENCOUNTER: ICD-10-CM

## 2025-07-08 DIAGNOSIS — R73.03 PREDIABETES: ICD-10-CM

## 2025-07-08 DIAGNOSIS — L97.522 PLANTAR ULCER OF LEFT FOOT WITH FAT LAYER EXPOSED (HCC): ICD-10-CM

## 2025-07-08 DIAGNOSIS — L97.512 PLANTAR ULCER OF RIGHT FOOT WITH FAT LAYER EXPOSED (HCC): ICD-10-CM

## 2025-07-08 DIAGNOSIS — L97.912 CHRONIC ULCER OF RIGHT LOWER EXTREMITY WITH FAT LAYER EXPOSED (HCC): ICD-10-CM

## 2025-07-08 DIAGNOSIS — L97.922 CHRONIC ULCER OF LEFT LOWER EXTREMITY WITH FAT LAYER EXPOSED (HCC): ICD-10-CM

## 2025-07-08 DIAGNOSIS — L97.523 CHRONIC FOOT ULCER, LEFT, WITH NECROSIS OF MUSCLE (HCC): ICD-10-CM

## 2025-07-08 DIAGNOSIS — S91.302D OPEN WOUND OF LEFT FOOT WITH TENDON INVOLVEMENT, SUBSEQUENT ENCOUNTER: ICD-10-CM

## 2025-07-08 DIAGNOSIS — S91.301D OPEN WOUND OF RIGHT FOOT, SUBSEQUENT ENCOUNTER: ICD-10-CM

## 2025-07-08 DIAGNOSIS — S91.301A OPEN WOUND OF RIGHT FOOT, INITIAL ENCOUNTER: ICD-10-CM

## 2025-07-08 DIAGNOSIS — S96.902D OPEN WOUND OF LEFT FOOT WITH TENDON INVOLVEMENT, SUBSEQUENT ENCOUNTER: ICD-10-CM

## 2025-07-08 DIAGNOSIS — L97.322 CHRONIC ULCER OF LEFT ANKLE WITH FAT LAYER EXPOSED (HCC): ICD-10-CM

## 2025-07-08 LAB
ALBUMIN SERPL-MCNC: 3.9 G/DL (ref 3.5–5.2)
ALBUMIN/GLOB SERPL: 1.1 (ref 1.1–2.2)
ALP SERPL-CCNC: 88 U/L (ref 35–104)
ALT SERPL-CCNC: 38 U/L (ref 10–35)
ANION GAP SERPL CALC-SCNC: 16 MMOL/L (ref 2–12)
AST SERPL-CCNC: 125 U/L (ref 10–35)
BASOPHILS # BLD: 0.03 K/UL (ref 0–0.1)
BASOPHILS NFR BLD: 0.4 % (ref 0–1)
BILIRUB SERPL-MCNC: 0.5 MG/DL (ref 0.2–1)
BUN SERPL-MCNC: 19 MG/DL (ref 8–23)
BUN/CREAT SERPL: 18 (ref 12–20)
CALCIUM SERPL-MCNC: 9.7 MG/DL (ref 8.8–10.2)
CHLORIDE SERPL-SCNC: 100 MMOL/L (ref 98–107)
CO2 SERPL-SCNC: 24 MMOL/L (ref 22–29)
CREAT SERPL-MCNC: 1.05 MG/DL (ref 0.5–0.9)
DIFFERENTIAL METHOD BLD: ABNORMAL
EOSINOPHIL # BLD: 0.11 K/UL (ref 0–0.4)
EOSINOPHIL NFR BLD: 1.6 % (ref 0–7)
ERYTHROCYTE [DISTWIDTH] IN BLOOD BY AUTOMATED COUNT: 14 % (ref 11.5–14.5)
GLOBULIN SER CALC-MCNC: 3.4 G/DL (ref 2–4)
GLUCOSE SERPL-MCNC: 142 MG/DL (ref 65–100)
HCT VFR BLD AUTO: 30.5 % (ref 35–47)
HGB BLD-MCNC: 9.7 G/DL (ref 11.5–16)
IMM GRANULOCYTES # BLD AUTO: 0.04 K/UL (ref 0–0.04)
IMM GRANULOCYTES NFR BLD AUTO: 0.6 % (ref 0–0.5)
LIPASE SERPL-CCNC: >3000 U/L (ref 13–60)
LYMPHOCYTES # BLD: 0.58 K/UL (ref 0.8–3.5)
LYMPHOCYTES NFR BLD: 8.3 % (ref 12–49)
MCH RBC QN AUTO: 26.5 PG (ref 26–34)
MCHC RBC AUTO-ENTMCNC: 31.8 G/DL (ref 30–36.5)
MCV RBC AUTO: 83.3 FL (ref 80–99)
MONOCYTES # BLD: 0.66 K/UL (ref 0–1)
MONOCYTES NFR BLD: 9.4 % (ref 5–13)
NEUTS SEG # BLD: 5.58 K/UL (ref 1.8–8)
NEUTS SEG NFR BLD: 79.7 % (ref 32–75)
NRBC # BLD: 0 K/UL (ref 0–0.01)
NRBC BLD-RTO: 0 PER 100 WBC
PLATELET # BLD AUTO: 246 K/UL (ref 150–400)
PMV BLD AUTO: 10.6 FL (ref 8.9–12.9)
POTASSIUM SERPL-SCNC: 3.1 MMOL/L (ref 3.5–5.1)
PROT SERPL-MCNC: 7.3 G/DL (ref 6.4–8.3)
RBC # BLD AUTO: 3.66 M/UL (ref 3.8–5.2)
RBC MORPH BLD: ABNORMAL
SODIUM SERPL-SCNC: 140 MMOL/L (ref 136–145)
TROPONIN T SERPL HS-MCNC: 13.3 NG/L (ref 0–14)
WBC # BLD AUTO: 7 K/UL (ref 3.6–11)

## 2025-07-08 PROCEDURE — 11043 DBRDMT MUSC&/FSCA 1ST 20/<: CPT

## 2025-07-08 PROCEDURE — 6360000004 HC RX CONTRAST MEDICATION: Performed by: EMERGENCY MEDICINE

## 2025-07-08 PROCEDURE — 84484 ASSAY OF TROPONIN QUANT: CPT

## 2025-07-08 PROCEDURE — 83690 ASSAY OF LIPASE: CPT

## 2025-07-08 PROCEDURE — 74177 CT ABD & PELVIS W/CONTRAST: CPT

## 2025-07-08 PROCEDURE — 96374 THER/PROPH/DIAG INJ IV PUSH: CPT

## 2025-07-08 PROCEDURE — 80053 COMPREHEN METABOLIC PANEL: CPT

## 2025-07-08 PROCEDURE — 36415 COLL VENOUS BLD VENIPUNCTURE: CPT

## 2025-07-08 PROCEDURE — 85025 COMPLETE CBC W/AUTO DIFF WBC: CPT

## 2025-07-08 PROCEDURE — 17250 CHEM CAUT OF GRANLTJ TISSUE: CPT

## 2025-07-08 PROCEDURE — 11046 DBRDMT MUSC&/FSCA EA ADDL: CPT

## 2025-07-08 PROCEDURE — 96375 TX/PRO/DX INJ NEW DRUG ADDON: CPT

## 2025-07-08 PROCEDURE — 2500000003 HC RX 250 WO HCPCS: Performed by: EMERGENCY MEDICINE

## 2025-07-08 PROCEDURE — 6360000002 HC RX W HCPCS: Performed by: EMERGENCY MEDICINE

## 2025-07-08 RX ORDER — ACETAMINOPHEN 500 MG
1000 TABLET ORAL 3 TIMES DAILY
Qty: 120 TABLET | Refills: 3 | Status: SHIPPED | OUTPATIENT
Start: 2025-07-08

## 2025-07-08 RX ORDER — MORPHINE SULFATE 2 MG/ML
2 INJECTION, SOLUTION INTRAMUSCULAR; INTRAVENOUS ONCE
Status: COMPLETED | OUTPATIENT
Start: 2025-07-08 | End: 2025-07-08

## 2025-07-08 RX ORDER — MORPHINE SULFATE 4 MG/ML
4 INJECTION, SOLUTION INTRAMUSCULAR; INTRAVENOUS
Refills: 0 | Status: DISCONTINUED | OUTPATIENT
Start: 2025-07-08 | End: 2025-07-08

## 2025-07-08 RX ORDER — FAMOTIDINE 20 MG/1
20 TABLET, FILM COATED ORAL 2 TIMES DAILY
Qty: 30 TABLET | Refills: 0 | Status: SHIPPED | OUTPATIENT
Start: 2025-07-08

## 2025-07-08 RX ORDER — ONDANSETRON 4 MG/1
4 TABLET, ORALLY DISINTEGRATING ORAL EVERY 8 HOURS PRN
Qty: 30 TABLET | Refills: 0 | Status: SHIPPED | OUTPATIENT
Start: 2025-07-08

## 2025-07-08 RX ORDER — IOPAMIDOL 755 MG/ML
100 INJECTION, SOLUTION INTRAVASCULAR
Status: COMPLETED | OUTPATIENT
Start: 2025-07-08 | End: 2025-07-08

## 2025-07-08 RX ORDER — OXYCODONE HYDROCHLORIDE 5 MG/1
5 TABLET ORAL EVERY 4 HOURS PRN
Qty: 10 TABLET | Refills: 0 | Status: SHIPPED | OUTPATIENT
Start: 2025-07-08 | End: 2025-07-11

## 2025-07-08 RX ORDER — POLYETHYLENE GLYCOL 3350 17 G/17G
17 POWDER, FOR SOLUTION ORAL DAILY
Qty: 1 EACH | Refills: 0 | Status: SHIPPED | OUTPATIENT
Start: 2025-07-08 | End: 2025-07-08

## 2025-07-08 RX ADMIN — SODIUM CHLORIDE 40 MG: 9 INJECTION INTRAMUSCULAR; INTRAVENOUS; SUBCUTANEOUS at 00:26

## 2025-07-08 RX ADMIN — IOPAMIDOL 100 ML: 755 INJECTION, SOLUTION INTRAVENOUS at 01:36

## 2025-07-08 RX ADMIN — MORPHINE SULFATE 2 MG: 2 INJECTION, SOLUTION INTRAMUSCULAR; INTRAVENOUS at 02:13

## 2025-07-08 RX ADMIN — ONDANSETRON 4 MG: 2 INJECTION, SOLUTION INTRAMUSCULAR; INTRAVENOUS at 00:26

## 2025-07-08 NOTE — ED TRIAGE NOTES
Pt here from home via POV for upper abdominal pain that she noticed yesterday but intensified this afternoon. Pt states she noticed that after she ate and took APAP for her hip pain, she began having upper abdominal apin, dry heaves, denies diarrhea. Denies cardiac PMHx. Denies PMHx of same. VSS in triage. PMHx uterine CA until June 2024, then was prescribed Keytruda and had allergic reaction and had to stop.

## 2025-07-08 NOTE — ED PROVIDER NOTES
469 ms    P Axis 13 degrees    R Axis 36 degrees    T Axis 33 degrees    Diagnosis       Normal sinus rhythm  Normal ECG  No previous ECGs available            CONSULTS:  None    PROCEDURES:  Unless otherwise noted below, none     Procedures      FINAL IMPRESSION      1. Abdominal pain, epigastric    2. Acute pancreatitis, unspecified complication status, unspecified pancreatitis type    3. Nausea and vomiting, unspecified vomiting type    4. Gallstones          DISPOSITION/PLAN   DISPOSITION Decision To Discharge 07/08/2025 02:39:26 AM      PATIENT REFERRED TO:  Amy German MD  [address] [place] [id number] [phone]    Call       Ethel Shirley MD  [id number] [facility] [address]  [facility] [address] [phone]          [facility]  [address] [place] [address]  [phone]          DISCHARGE MEDICATIONS:  Discharge Medication List as of 7/8/2025  2:41 AM        START taking these medications    Details   ondansetron (ZOFRAN-ODT) 4 MG disintegrating tablet Take 1 tablet by mouth every 8 hours as needed for Nausea or Vomiting, Disp-30 tablet, R-0Normal      famotidine (PEPCID) 20 MG tablet Take 1 tablet by mouth 2 times daily, Disp-30 tablet, R-0Normal      acetaminophen (TYLENOL) 500 MG tablet Take 2 tablets by mouth in the morning, at noon, and at bedtime, Disp-120 tablet, R-3Normal      oxyCODONE (ROXICODONE) 5 MG immediate release tablet Take 1 tablet by mouth every 4 hours as needed for Pain for up to 3 days. Max Daily Amount: 30 mg, Disp-10 tablet, R-0Normal      polyethylene glycol (GLYCOLAX) 17 GM/SCOOP powder Take 17 g by mouth daily for 7 days, Disp-1 each, R-0Normal               (Please note that portions of this note were completed with a voice recognition program.  Efforts were made to edit the dictations but occasionally words are mis-transcribed.)    Te Munoz MD (electronically signed)  Emergency Attending

## 2025-07-08 NOTE — PROGRESS NOTES
[facility]   Wound Care and [facility] O[facility] Therapy Center   Medical Staff Note     Jackie Yuan  MEDICAL RECORD NUMBER:  [id number]  AGE: 60 y.o.   GENDER: female  : 1965  EPISODE DATE:  2025    Chief complaint and reason for visit:     Chief Complaint   Patient presents with    Wound Check     BLE      Patient presenting for evaluation of wound(s) per chief complaint.      58 yo F with endometrial cancer and prediabetes presents with multiple lower extremity wounds. She had a reaction to chemotherapy medication Keytruda which resulted in the wounds. Patient reports all wounds started after going on Keytruda.     Reports wound pain and drainage of right medial ankle has resolved since starting antibiotics.   No wound erythema. She is working from home on Tue and Thur. At work she alternates which leg she is keeping elevated.  Keeping legs elevated has decreased leg swelling and wound discharge.     She received the wound care supplies.      History of Wound Context: Per original history and physical on this patient. Changes in history since last evaluation: none      Medical Decision Making:     Problem List Items Addressed This Visit          Musculoskeletal and Integument    Open wound of left foot with tendon involvement    Relevant Medications    lidocaine 4 % jelly    Other Relevant Orders    Initiate Outpatient Wound Care Protocol       Other    BMI 60.0-69.9, adult (HCC) - Primary    Relevant Medications    lidocaine 4 % jelly    Other Relevant Orders    Initiate Outpatient Wound Care Protocol    Prediabetes    Relevant Medications    lidocaine 4 % jelly    Other Relevant Orders    Initiate Outpatient Wound Care Protocol    Open wound of right foot    Relevant Medications    lidocaine 4 % jelly    Other Relevant Orders    Initiate Outpatient Wound Care Protocol    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

## 2025-07-08 NOTE — WOUND CARE
need help with your wound outside these hours and cannot wait until we are again available, contact your PCP or go to the hospital emergency room.     PLEASE NOTE: IF YOU ARE UNABLE TO OBTAIN WOUND SUPPLIES, CONTINUE TO USE THE SUPPLIES YOU HAVE AVAILABLE UNTIL YOU ARE ABLE TO REACH US. IT IS MOST IMPORTANT TO KEEP THE WOUND COVERED AT ALL TIMES.

## 2025-07-08 NOTE — DISCHARGE INSTRUCTIONS
Avoid prolonged standing in one place.     Compression:  Apply: [] Compression Wrap to []RightLeg []Left Leg    []  Coflex [] Coflex Lite  [] Unna with Calamine Lite     [] Elevate leg(s) above the level of the heart when sitting.    [] Avoid prolonged standing in one place.   [] Do not get leg(s) with wrap wet. If wrap becomes too tight, call the wound center and remove wrap from leg by unrolling each layer.    Off-Loading:   [] Off-loading when: [] walking  [] in bed [] sitting  [] Total non-weight bearing  [] Right Leg  [] Left Leg   [] Assistive Device [] Walker [] Cane  [] Wheelchair  [] Crutches   [] Surgical shoe    [] Wedge Shoe  [] Foam Boot(s)  [] Knee Scooter    [] Cast Boot [] CROW Boot     [] Diabetic Shoes   [] Offloading shoe [] Offloading heel boot  [] Other:     Contact Cast:  Apply: [] Total Contact Cast Applied in Clinic to []RightLeg []Left Leg   [] Do not get cast wet.  Contact wound center or go to emergency room if there is a foul odor or becomes uncomfortable due to feeling tight or swelling.  Do not use objects inside of cast to scratch.      Dietary:  [] Diet as tolerated: [] Calorie Diabetic Diet: [x] No Added Salt:  [x] Increase Protein: [] Other:     Activity:  [] Activity as tolerated:    [] Patient has no activity restrictions      [] Strict Bedrest   [] Remain off Work      [] May return to full duty work                                     [x] Return to work with restrictions: Elevate legs as much as possible.     Physician:  [] Dr. Maribel Padilla  [] Jake Simmons PA-C  [] Dr. Christine Brown  [] Dr. Juan Jacobo  [] Dr. Gen Muñoz  [] Dr. Aileen Alvarez  [x] Dr. Shaila Blankenship      Nurse Case Manger:  Carissa KESSLER RN      Wound Care Center Information: Should you experience any significant changes in your wound(s) or have questions about your wound care, please contact the Wound Center at [phone]. Our hours are Monday - Friday 8am - 4:30pm, closed all major holidays. If you

## 2025-07-09 LAB
EKG ATRIAL RATE: 96 BPM
EKG DIAGNOSIS: NORMAL
EKG P AXIS: 13 DEGREES
EKG P-R INTERVAL: 170 MS
EKG Q-T INTERVAL: 372 MS
EKG QRS DURATION: 80 MS
EKG QTC CALCULATION (BAZETT): 469 MS
EKG R AXIS: 36 DEGREES
EKG T AXIS: 33 DEGREES
EKG VENTRICULAR RATE: 96 BPM

## 2025-07-09 PROCEDURE — 93010 ELECTROCARDIOGRAM REPORT: CPT | Performed by: SPECIALIST

## 2025-07-22 ENCOUNTER — HOSPITAL ENCOUNTER (OUTPATIENT)
Facility: HOSPITAL | Age: 60
Discharge: HOME | End: 2025-07-22
Attending: SURGERY
Payer: COMMERCIAL

## 2025-07-22 VITALS
HEART RATE: 87 BPM | DIASTOLIC BLOOD PRESSURE: 62 MMHG | SYSTOLIC BLOOD PRESSURE: 126 MMHG | RESPIRATION RATE: 18 BRPM | TEMPERATURE: 97.3 F

## 2025-07-22 DIAGNOSIS — L97.322 CHRONIC ULCER OF LEFT ANKLE WITH FAT LAYER EXPOSED (HCC): ICD-10-CM

## 2025-07-22 DIAGNOSIS — S91.301D OPEN WOUND OF RIGHT FOOT, SUBSEQUENT ENCOUNTER: ICD-10-CM

## 2025-07-22 DIAGNOSIS — L97.912 CHRONIC ULCER OF RIGHT LOWER EXTREMITY WITH FAT LAYER EXPOSED (HCC): ICD-10-CM

## 2025-07-22 DIAGNOSIS — S91.302D OPEN WOUND OF LEFT FOOT WITH TENDON INVOLVEMENT, SUBSEQUENT ENCOUNTER: ICD-10-CM

## 2025-07-22 DIAGNOSIS — L97.313: ICD-10-CM

## 2025-07-22 DIAGNOSIS — L97.522 PLANTAR ULCER OF LEFT FOOT WITH FAT LAYER EXPOSED (HCC): ICD-10-CM

## 2025-07-22 DIAGNOSIS — S96.902A OPEN WOUND OF LEFT FOOT WITH TENDON INVOLVEMENT, INITIAL ENCOUNTER: ICD-10-CM

## 2025-07-22 DIAGNOSIS — L97.523 CHRONIC FOOT ULCER, LEFT, WITH NECROSIS OF MUSCLE (HCC): ICD-10-CM

## 2025-07-22 DIAGNOSIS — L97.512 PLANTAR ULCER OF RIGHT FOOT WITH FAT LAYER EXPOSED (HCC): ICD-10-CM

## 2025-07-22 DIAGNOSIS — S96.902D OPEN WOUND OF LEFT FOOT WITH TENDON INVOLVEMENT, SUBSEQUENT ENCOUNTER: ICD-10-CM

## 2025-07-22 DIAGNOSIS — L97.922 CHRONIC ULCER OF LEFT LOWER EXTREMITY WITH FAT LAYER EXPOSED (HCC): ICD-10-CM

## 2025-07-22 DIAGNOSIS — R73.03 PREDIABETES: ICD-10-CM

## 2025-07-22 DIAGNOSIS — S91.301A OPEN WOUND OF RIGHT FOOT, INITIAL ENCOUNTER: ICD-10-CM

## 2025-07-22 DIAGNOSIS — S91.302A OPEN WOUND OF LEFT FOOT WITH TENDON INVOLVEMENT, INITIAL ENCOUNTER: ICD-10-CM

## 2025-07-22 PROCEDURE — 11046 DBRDMT MUSC&/FSCA EA ADDL: CPT

## 2025-07-22 PROCEDURE — 11043 DBRDMT MUSC&/FSCA 1ST 20/<: CPT

## 2025-07-22 NOTE — WOUND CARE
Alginate  [] Calcium Alginate with Silver   [] Calcium Alginate without silver   [] Collagen with silver [x] Collagen without Silver - 2nd   [] Santyl with moist saline gauze     [] Hydrofera Blue (cut to size and moistened with normal saline)  [] Hydrofera Blue Ready (cut to size)      [] Normal Saline wet to dry  [] Betadine wet to dry [] Betadine to obdulia wound   [] Hydrogel  [] Mepitel  [] Xeroform    [x] Adaptic (to tendon) - 1st   [] Iodoform Packing Strip [] Plain Packing Strip   [] Skin Sub:    [x] Mepilex Transfer - 3rd  [] Other:      [x] Cover and Secure with:     [x] Gauze             [x] Heidy Or [x] Kerlix   [] Foam Piece [] Foam Heel Cup     [] Super Absorbant   [] ABD  [] Ace Wrap             [] Bordered Gauze Dressing      [] Mepilex Bordered Foam Dressing      [] Surgilast     [] Other:    Limit contact of tape with skin.    [x] Change dressing: [] Daily    [x] Every Other Day   [] Twice per week   [] Three times per week   [] Once a week [] Do Not Change Dressing   [] Other:     Skin Sub:           Wound Location:   [] Implanted on:    [] Only change outer dressing  [] Do not change dressing until:    [] Other:     Negative Pressure:           Wound Location:   [] Pressure @  mm/Hg  []Continuous []Intermittent   [] Black Foam  [] White Foam [] Bridge  [] Change dressing 3 times per week     [] Other:     Pressure Relief:  [] When sitting, shift position or do seat lifts every 15 minutes.  [] Wheelchair cushion [] Specialty Bed/Mattress  [] Turn every 2 hours when in bed.  Avoid direct pressure on wound site.  Limit side lying to 30 degree tilt.  Limit HOB elevation to 30 degrees.  [] Other:     Edema Control:  Apply: [] Compression Stocking:  mmHg  []Right Leg []Left Leg   [x] Tubigrip []Right Leg Double Layer []Left Leg Double Layer      [x]Right Leg Single Layer [x]Left Leg Single Layer   [] Ace Wrap []Right Leg  []Left Leg      [x] Elevate leg(s) above the level of the heart when sitting.    [x]

## 2025-07-22 NOTE — PROGRESS NOTES
[facility]   Wound Care and Hyper[facility]ic Oxygen Therapy Center   Medical Staff Note     Jackie Yuan  MEDICAL RECORD NUMBER:  [id number]  AGE: 60 y.o.   GENDER: female  : 1965  EPISODE DATE:  2025    Chief complaint and reason for visit:     Chief Complaint   Patient presents with    Wound Check     L Thigh, L Foot, R ankle      Patient presenting for evaluation of wound(s) per chief complaint.      60 yo F with endometrial cancer and prediabetes presents with multiple lower extremity wounds. She had a reaction to chemotherapy medication Keytruda which resulted in the wounds. Patient reports all wounds started after going on Keytruda.     Wound pain and drainage are at baseline. No wound erythema. She is working from home on Tue and Thur. At work she alternates which leg she is keeping elevated.  Keeping legs elevated has decreased leg swelling and wound discharge.     History of Wound Context: Per original history and physical on this patient. Changes in history since last evaluation: none      Medical Decision Making:     Problem List Items Addressed This Visit          Musculoskeletal and Integument    Open wound of left foot with tendon involvement    Relevant Medications    lidocaine 4 % jelly    Other Relevant Orders    Initiate Outpatient Wound Care Protocol       Other    BMI 60.0-69.9, adult (HCC) - Primary    Relevant Medications    lidocaine 4 % jelly    Other Relevant Orders    Initiate Outpatient Wound Care Protocol    Prediabetes    Relevant Medications    lidocaine 4 % jelly    Other Relevant Orders    Initiate Outpatient Wound Care Protocol    Open wound of right foot    Relevant Medications    lidocaine 4 % jelly    Other Relevant Orders    Initiate Outpatient Wound Care Protocol    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

## 2025-07-22 NOTE — DISCHARGE INSTRUCTIONS
Avoid prolonged standing in one place.     Compression:  Apply: [] Compression Wrap to []RightLeg []Left Leg    []  Coflex [] Coflex Lite  [] Unna with Calamine Lite     [] Elevate leg(s) above the level of the heart when sitting.    [] Avoid prolonged standing in one place.   [] Do not get leg(s) with wrap wet. If wrap becomes too tight, call the wound center and remove wrap from leg by unrolling each layer.    Off-Loading:   [] Off-loading when: [] walking  [] in bed [] sitting  [] Total non-weight bearing  [] Right Leg  [] Left Leg   [] Assistive Device [] Walker [] Cane  [] Wheelchair  [] Crutches   [] Surgical shoe    [] Wedge Shoe  [] Foam Boot(s)  [] Knee Scooter    [] Cast Boot [] CROW Boot     [] Diabetic Shoes   [] Offloading shoe [] Offloading heel boot  [] Other:     Contact Cast:  Apply: [] Total Contact Cast Applied in Clinic to []RightLeg []Left Leg   [] Do not get cast wet.  Contact wound center or go to emergency room if there is a foul odor or becomes uncomfortable due to feeling tight or swelling.  Do not use objects inside of cast to scratch.      Dietary:  [] Diet as tolerated: [] Calorie Diabetic Diet: [x] No Added Salt:  [x] Increase Protein: [] Other:     Activity:  [] Activity as tolerated:    [] Patient has no activity restrictions      [] Strict Bedrest   [] Remain off Work      [] May return to full duty work                                     [x] Return to work with restrictions: Elevate legs as much as possible.     Physician:  [] Dr. Maribel Padilla  [] Jake Simmons PA-C  [] Dr. Christine Brown  [] Dr. Juan Jacobo  [] Dr. Gen Muñoz  [] Dr. Aileen Alvarez  [x] Dr. Sahila Blankenship      Nurse Case Manger:  Carissa KESSLER RN      Wound Care Center Information: Should you experience any significant changes in your wound(s) or have questions about your wound care, please contact the Wound Center at [phone]. Our hours are Monday - Friday 8am - 4:30pm, closed all major holidays. If you

## 2025-08-05 ENCOUNTER — HOSPITAL ENCOUNTER (OUTPATIENT)
Facility: HOSPITAL | Age: 60
Discharge: HOME | End: 2025-08-05
Attending: SURGERY
Payer: COMMERCIAL

## 2025-08-05 VITALS
HEART RATE: 91 BPM | TEMPERATURE: 97 F | SYSTOLIC BLOOD PRESSURE: 111 MMHG | RESPIRATION RATE: 18 BRPM | DIASTOLIC BLOOD PRESSURE: 59 MMHG

## 2025-08-05 DIAGNOSIS — S96.902A OPEN WOUND OF LEFT FOOT WITH TENDON INVOLVEMENT, INITIAL ENCOUNTER: ICD-10-CM

## 2025-08-05 DIAGNOSIS — S96.902D OPEN WOUND OF LEFT FOOT WITH TENDON INVOLVEMENT, SUBSEQUENT ENCOUNTER: ICD-10-CM

## 2025-08-05 DIAGNOSIS — L97.322 CHRONIC ULCER OF LEFT ANKLE WITH FAT LAYER EXPOSED (HCC): ICD-10-CM

## 2025-08-05 DIAGNOSIS — L97.512 PLANTAR ULCER OF RIGHT FOOT WITH FAT LAYER EXPOSED (HCC): ICD-10-CM

## 2025-08-05 DIAGNOSIS — L97.522 PLANTAR ULCER OF LEFT FOOT WITH FAT LAYER EXPOSED (HCC): ICD-10-CM

## 2025-08-05 DIAGNOSIS — S91.302A OPEN WOUND OF LEFT FOOT WITH TENDON INVOLVEMENT, INITIAL ENCOUNTER: ICD-10-CM

## 2025-08-05 DIAGNOSIS — L97.523 CHRONIC FOOT ULCER, LEFT, WITH NECROSIS OF MUSCLE (HCC): ICD-10-CM

## 2025-08-05 DIAGNOSIS — S91.302D OPEN WOUND OF LEFT FOOT WITH TENDON INVOLVEMENT, SUBSEQUENT ENCOUNTER: ICD-10-CM

## 2025-08-05 DIAGNOSIS — L97.912 CHRONIC ULCER OF RIGHT LOWER EXTREMITY WITH FAT LAYER EXPOSED (HCC): ICD-10-CM

## 2025-08-05 DIAGNOSIS — S91.301D OPEN WOUND OF RIGHT FOOT, SUBSEQUENT ENCOUNTER: ICD-10-CM

## 2025-08-05 DIAGNOSIS — L97.313: ICD-10-CM

## 2025-08-05 DIAGNOSIS — R73.03 PREDIABETES: ICD-10-CM

## 2025-08-05 DIAGNOSIS — L97.922 CHRONIC ULCER OF LEFT LOWER EXTREMITY WITH FAT LAYER EXPOSED (HCC): ICD-10-CM

## 2025-08-05 DIAGNOSIS — S91.301A OPEN WOUND OF RIGHT FOOT, INITIAL ENCOUNTER: ICD-10-CM

## 2025-08-05 PROCEDURE — 11043 DBRDMT MUSC&/FSCA 1ST 20/<: CPT

## 2025-08-05 PROCEDURE — 11046 DBRDMT MUSC&/FSCA EA ADDL: CPT

## 2025-08-05 PROCEDURE — 17250 CHEM CAUT OF GRANLTJ TISSUE: CPT

## 2025-08-07 ENCOUNTER — TELEPHONE (OUTPATIENT)
Age: 60
End: 2025-08-07

## 2025-08-19 ENCOUNTER — HOSPITAL ENCOUNTER (OUTPATIENT)
Facility: HOSPITAL | Age: 60
Discharge: HOME | End: 2025-08-19
Attending: SURGERY
Payer: COMMERCIAL

## 2025-08-19 VITALS
TEMPERATURE: 97.3 F | HEART RATE: 98 BPM | SYSTOLIC BLOOD PRESSURE: 131 MMHG | RESPIRATION RATE: 18 BRPM | DIASTOLIC BLOOD PRESSURE: 61 MMHG

## 2025-08-19 DIAGNOSIS — L97.523 CHRONIC FOOT ULCER, LEFT, WITH NECROSIS OF MUSCLE (HCC): ICD-10-CM

## 2025-08-19 DIAGNOSIS — L97.313: ICD-10-CM

## 2025-08-19 DIAGNOSIS — L97.912 CHRONIC ULCER OF RIGHT LOWER EXTREMITY WITH FAT LAYER EXPOSED (HCC): ICD-10-CM

## 2025-08-19 DIAGNOSIS — R73.03 PREDIABETES: ICD-10-CM

## 2025-08-19 DIAGNOSIS — L97.512 PLANTAR ULCER OF RIGHT FOOT WITH FAT LAYER EXPOSED (HCC): ICD-10-CM

## 2025-08-19 DIAGNOSIS — L97.522 PLANTAR ULCER OF LEFT FOOT WITH FAT LAYER EXPOSED (HCC): ICD-10-CM

## 2025-08-19 DIAGNOSIS — S91.302A OPEN WOUND OF LEFT FOOT WITH TENDON INVOLVEMENT, INITIAL ENCOUNTER: ICD-10-CM

## 2025-08-19 DIAGNOSIS — S91.301A OPEN WOUND OF RIGHT FOOT, INITIAL ENCOUNTER: ICD-10-CM

## 2025-08-19 DIAGNOSIS — L97.922 CHRONIC ULCER OF LEFT LOWER EXTREMITY WITH FAT LAYER EXPOSED (HCC): Primary | ICD-10-CM

## 2025-08-19 DIAGNOSIS — S96.902A OPEN WOUND OF LEFT FOOT WITH TENDON INVOLVEMENT, INITIAL ENCOUNTER: ICD-10-CM

## 2025-08-19 DIAGNOSIS — L97.322 CHRONIC ULCER OF LEFT ANKLE WITH FAT LAYER EXPOSED (HCC): ICD-10-CM

## 2025-08-19 PROCEDURE — 11045 DBRDMT SUBQ TISS EACH ADDL: CPT

## 2025-08-19 PROCEDURE — 11042 DBRDMT SUBQ TIS 1ST 20SQCM/<: CPT

## 2025-08-19 ASSESSMENT — PAIN SCALES - GENERAL: PAINLEVEL_OUTOF10: 0

## 2025-08-21 ENCOUNTER — OFFICE VISIT (OUTPATIENT)
Facility: CLINIC | Age: 60
End: 2025-08-21
Payer: COMMERCIAL

## 2025-08-21 VITALS
HEART RATE: 80 BPM | WEIGHT: 293 LBS | SYSTOLIC BLOOD PRESSURE: 120 MMHG | DIASTOLIC BLOOD PRESSURE: 74 MMHG | HEIGHT: 66 IN | TEMPERATURE: 97.6 F | BODY MASS INDEX: 47.09 KG/M2 | OXYGEN SATURATION: 98 %

## 2025-08-21 DIAGNOSIS — R74.8 ELEVATED LIPASE: ICD-10-CM

## 2025-08-21 DIAGNOSIS — R74.8 ELEVATED LIPASE: Primary | ICD-10-CM

## 2025-08-21 DIAGNOSIS — R10.13 EPIGASTRIC PAIN: ICD-10-CM

## 2025-08-21 PROCEDURE — 3074F SYST BP LT 130 MM HG: CPT | Performed by: NURSE PRACTITIONER

## 2025-08-21 PROCEDURE — 3078F DIAST BP <80 MM HG: CPT | Performed by: NURSE PRACTITIONER

## 2025-08-21 PROCEDURE — 99214 OFFICE O/P EST MOD 30 MIN: CPT | Performed by: NURSE PRACTITIONER

## 2025-08-22 ENCOUNTER — TELEPHONE (OUTPATIENT)
Facility: CLINIC | Age: 60
End: 2025-08-22

## 2025-08-25 ASSESSMENT — ENCOUNTER SYMPTOMS
ABDOMINAL PAIN: 1
NAUSEA: 1

## 2025-08-27 LAB — LIPASE SERPL-CCNC: 23 U/L (ref 14–72)
